# Patient Record
Sex: FEMALE | Race: WHITE | NOT HISPANIC OR LATINO | Employment: FULL TIME
[De-identification: names, ages, dates, MRNs, and addresses within clinical notes are randomized per-mention and may not be internally consistent; named-entity substitution may affect disease eponyms.]

---

## 2017-06-03 ENCOUNTER — HEALTH MAINTENANCE LETTER (OUTPATIENT)
Age: 42
End: 2017-06-03

## 2022-02-11 ENCOUNTER — TRANSFERRED RECORDS (OUTPATIENT)
Dept: ORTHOPEDICS | Facility: CLINIC | Age: 47
End: 2022-02-11
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
## 2022-05-04 ENCOUNTER — OFFICE VISIT (OUTPATIENT)
Dept: OPHTHALMOLOGY | Facility: CLINIC | Age: 47
End: 2022-05-04
Attending: OPTOMETRIST
Payer: COMMERCIAL

## 2022-05-04 DIAGNOSIS — H25.13 NUCLEAR SENILE CATARACT OF BOTH EYES: ICD-10-CM

## 2022-05-04 DIAGNOSIS — H52.4 PRESBYOPIA OF BOTH EYES: ICD-10-CM

## 2022-05-04 DIAGNOSIS — H02.823 SEBACEOUS CYST OF EYELID, RIGHT: ICD-10-CM

## 2022-05-04 DIAGNOSIS — D31.31 CHOROIDAL NEVUS OF RIGHT EYE: Primary | ICD-10-CM

## 2022-05-04 PROBLEM — I21.4 NSTEMI (NON-ST ELEVATED MYOCARDIAL INFARCTION) (H): Status: ACTIVE | Noted: 2020-04-25

## 2022-05-04 PROBLEM — F15.921: Status: ACTIVE | Noted: 2021-03-10

## 2022-05-04 PROBLEM — F17.200 TOBACCO USE DISORDER: Status: ACTIVE | Noted: 2022-02-23

## 2022-05-04 PROBLEM — R07.89 CHEST TIGHTNESS: Status: ACTIVE | Noted: 2020-04-24

## 2022-05-04 PROBLEM — M05.9 RHEUMATOID ARTHRITIS, SEROPOSITIVE (H): Status: ACTIVE | Noted: 2022-01-30

## 2022-05-04 PROBLEM — E78.49 OTHER HYPERLIPIDEMIA: Status: ACTIVE | Noted: 2022-01-30

## 2022-05-04 PROCEDURE — 92015 DETERMINE REFRACTIVE STATE: CPT

## 2022-05-04 PROCEDURE — G0463 HOSPITAL OUTPT CLINIC VISIT: HCPCS | Mod: 25

## 2022-05-04 PROCEDURE — 92004 COMPRE OPH EXAM NEW PT 1/>: CPT | Performed by: OPTOMETRIST

## 2022-05-04 PROCEDURE — 92250 FUNDUS PHOTOGRAPHY W/I&R: CPT | Performed by: OPTOMETRIST

## 2022-05-04 RX ORDER — FOLIC ACID 1 MG/1
1 TABLET ORAL DAILY
COMMUNITY
Start: 2021-03-12

## 2022-05-04 RX ORDER — AMLODIPINE BESYLATE 2.5 MG/1
2.5 TABLET ORAL
COMMUNITY
Start: 2022-04-12 | End: 2022-07-11

## 2022-05-04 RX ORDER — GABAPENTIN 600 MG/1
600 TABLET ORAL PRN
COMMUNITY
Start: 2022-03-19

## 2022-05-04 RX ORDER — LISDEXAMFETAMINE DIMESYLATE 10 MG/1
10 CAPSULE ORAL DAILY
COMMUNITY
Start: 2022-04-29

## 2022-05-04 RX ORDER — SIMVASTATIN 20 MG
20 TABLET ORAL AT BEDTIME
COMMUNITY
Start: 2022-02-22

## 2022-05-04 RX ORDER — LAMOTRIGINE 200 MG/1
150 TABLET ORAL AT BEDTIME
COMMUNITY
Start: 2021-03-12

## 2022-05-04 RX ORDER — HYDROCHLOROTHIAZIDE 25 MG/1
12.5 TABLET ORAL
COMMUNITY
Start: 2021-03-12

## 2022-05-04 ASSESSMENT — REFRACTION_MANIFEST
OD_AXIS: 170
OS_SPHERE: PLANO
OD_SPHERE: +0.25
OD_CYLINDER: +0.50
OS_CYLINDER: +0.50
OS_ADD: +2.00
OS_AXIS: 180
OD_ADD: +2.00

## 2022-05-04 ASSESSMENT — TONOMETRY
OD_IOP_MMHG: 16
IOP_METHOD: TONOPEN
OS_IOP_MMHG: 15

## 2022-05-04 ASSESSMENT — CONF VISUAL FIELD
METHOD: COUNTING FINGERS
OD_NORMAL: 1
OS_NORMAL: 1

## 2022-05-04 ASSESSMENT — SLIT LAMP EXAM - LIDS: COMMENTS: NORMAL

## 2022-05-04 ASSESSMENT — VISUAL ACUITY
METHOD: SNELLEN - LINEAR
OD_SC: 20/20
OS_SC: 20/20

## 2022-05-04 ASSESSMENT — EXTERNAL EXAM - RIGHT EYE: OD_EXAM: NORMAL

## 2022-05-04 ASSESSMENT — EXTERNAL EXAM - LEFT EYE: OS_EXAM: NORMAL

## 2022-05-04 NOTE — NURSING NOTE
Chief Complaints and History of Present Illnesses   Patient presents with     Blurred Vision Evaluation     Chief Complaint(s) and History of Present Illness(es)     Blurred Vision Evaluation               Comments     Pt states that she is having difficulty reading up close x3 months. Pt using OTC readers occasionally.  No eye pain today. No flashes or floaters. No redness. Dryness in BE, relief with drops.    MONICA Nichols May 4, 2022 8:26 AM

## 2022-06-07 ENCOUNTER — TRANSFERRED RECORDS (OUTPATIENT)
Dept: ORTHOPEDICS | Facility: CLINIC | Age: 47
End: 2022-06-07

## 2022-08-26 ENCOUNTER — OFFICE VISIT (OUTPATIENT)
Dept: ORTHOPEDICS | Facility: CLINIC | Age: 47
End: 2022-08-26
Payer: COMMERCIAL

## 2022-08-26 VITALS
HEIGHT: 67 IN | DIASTOLIC BLOOD PRESSURE: 72 MMHG | HEART RATE: 80 BPM | BODY MASS INDEX: 29.76 KG/M2 | SYSTOLIC BLOOD PRESSURE: 109 MMHG

## 2022-08-26 DIAGNOSIS — M67.88 ACHILLES TENDINOSIS: ICD-10-CM

## 2022-08-26 DIAGNOSIS — M25.571 CHRONIC PAIN OF RIGHT ANKLE: Primary | ICD-10-CM

## 2022-08-26 DIAGNOSIS — G89.29 CHRONIC PAIN OF RIGHT ANKLE: Primary | ICD-10-CM

## 2022-08-26 PROCEDURE — 99204 OFFICE O/P NEW MOD 45 MIN: CPT | Performed by: PEDIATRICS

## 2022-08-26 NOTE — PATIENT INSTRUCTIONS
Plan:  - Today's Plan of Care:  Referral to an Orthopedic Surgeon - Dr. Weaver  Discussed activity considerations and other supportive care including Ice/Heat, OTC and other topical medications as needed.    -We also discussed other future treatment options:  Referral to Physical Therapy  Discussed other non -operative options    Follow Up: as needed    If you have any further questions for your physician or physician s care team you can call [phone] and use option 3 to leave a voice message.

## 2022-08-26 NOTE — LETTER
8/26/2022         RE: Tamica Peguero  [address] [address]        Dear Colleague,    Thank you for referring your patient, Tamica Peguero, to the [facility] [place]. Please see a copy of my visit note below.    ASSESSMENT & PLAN    Tamica was seen today for pain.    Diagnoses and all orders for this visit:    Chronic pain of right ankle  -     Orthopedic  Referral; Future    Achilles tendinosis  -     Orthopedic  Referral; Future      This issue is chronic and Unchanged.  Discussed the diagnosis and potential treatment including physical therapy for eccentric strengthening.  Could consider heel cups or arch support as well.  Stressed the importance of rest from irritating activities.  If severe or does not improve, would consider boot immobilization for 1-2 weeks, advanced imaging or referral.  -Patient desires a discussion with orthopedic surgery for surgical options given this was already scheduled    Plan:  - Today's Plan of Care:  Referral to an Orthopedic Surgeon - Dr. Weaver  Discussed activity considerations and other supportive care including Ice/Heat, OTC and other topical medications as needed.    -We also discussed other future treatment options:  Referral to Physical Therapy  Discussed other non -operative options    Follow Up: as needed      Concerning signs and symptoms were reviewed.  The patient expressed understanding of this management plan and all questions were answered at this time.    Luann Live MD [facility]  Sports Medicine Physician  [facility]      -----  Chief Complaint   Patient presents with     Right Ankle - Pain       SUBJECTIVE  Tamica Peguero is a/an 46 year old female who is seen as a self referral for evaluation of chronic right ankle pain.     The patient is seen by themselves.    Onset: 1 years(s) ago. Reports insidious onset without acute precipitating event.  Location of  "Pain: right ankle; posterior, achilles tendon, gastrocnemius  Worsened by: walking, standing, any movement of the ankle  Better with: corticosteroid injection (2 weeks of relief)  Treatments tried: ice, heat and corticosteroid injection (most recent date: June 2022) that provided  2 week(s) of relief  Associated symptoms: weakness of right ankle, feeling of instability and lump on the posterior ankle    Orthopedic/Surgical history: YES - Date: previously seen at [facility] for the right ankle, states she was scheduled for surgery.  - Reviewed notes from [facility] Dr. Weaver which discussed home stretching program    Social History/Occupation: not currently working     No family history pertinent to patient's problem today.    REVIEW OF SYSTEMS:  Review of Systems  Skin: no bruising, yes posterior Achilles swelling  Musculoskeletal: as above  Neurologic: no numbness, paresthesias  Remainder of review of systems is negative including constitutional, CV, pulmonary, GI, except as noted in HPI or medical history.    OBJECTIVE:  /72   Pulse 80   Ht 1.702 m (5' 7\")   BMI 29.76 kg/m     General: healthy, alert and in no distress  HEENT: no scleral icterus or conjunctival erythema  Skin: no suspicious lesions or rash. No jaundice.  CV: distal perfusion intact  Resp: normal respiratory effort without conversational dyspnea   Psych: normal mood and affect  Gait: normal steady gait with appropriate coordination and balance  Neuro: Normal light sensory exam of lower extremity    Bilateral Foot and Ankle Exam:    Inspection:       Posterior Achilles tendon right    Foot inspection:       no deformity noted    Tender:       Posterior Achilles    Non-Tender:       remainder of ankle and foot bilateral    ROM:        Full active and passive ROM, ankle dorsiflexion, plantarflexion, inversion, eversion, great toe dorsiflexion, remainder of toes, midfoot and subtalar bilateral    Strength:       ankle dorsiflexion 5/5 bilateral       " plantarflexion 5/5 bilateral       inversion 5/5 bilateral       eversion 5/5 bilateral    Special Tests:       neg (-) anterior drawer right       neg (-) talar tilt right    Gait:       normal    Neurovascular:       2+ peripheral pulses bilaterally and brisk capillary refill       sensation grossly intact      RADIOLOGY:  I independently ordered, visualized and reviewed these images with the patient  Reviewed 3 views of right ankle 2/9/2022 - no acute bony abnormality, no significant degenerative change    Reviewed MRI of the right ankle from 2/11/2022 -prominent thickening of Achilles tendinosis peroneus brevis tendinopathy and chronic anterior talofibular ligament sprain    Reviewed [facility] notes  Review of the result(s) of each unique test - XR and MRI  {2021 E&M time (Optional):097177}  {Provider  Link to [facility] Help Grid :246719}         Again, thank you for allowing me to participate in the care of your patient.        Sincerely,        Luann Live MD

## 2022-08-26 NOTE — PROGRESS NOTES
ASSESSMENT & PLAN    Tamica was seen today for pain.    Diagnoses and all orders for this visit:    Chronic pain of right ankle  -     Orthopedic  Referral; Future    Achilles tendinosis  -     Orthopedic  Referral; Future      This issue is chronic and Unchanged.  Discussed the diagnosis and potential treatment including physical therapy for eccentric strengthening.  Could consider heel cups or arch support as well.  Stressed the importance of rest from irritating activities.  If severe or does not improve, would consider boot immobilization for 1-2 weeks, advanced imaging or referral.  -Patient desires a discussion with orthopedic surgery for surgical options given this was already scheduled    Plan:  - Today's Plan of Care:  Referral to an Orthopedic Surgeon - Dr. Weaver  Discussed activity considerations and other supportive care including Ice/Heat, OTC and other topical medications as needed.    -We also discussed other future treatment options:  Referral to Physical Therapy  Discussed other non -operative options    Follow Up: as needed      Concerning signs and symptoms were reviewed.  The patient expressed understanding of this management plan and all questions were answered at this time.    Luann Live MD [facility]  Sports Medicine Physician  [facility]      -----  Chief Complaint   Patient presents with     Right Ankle - Pain       SUBJECTIVE  Tamica Peguero is a/an 46 year old female who is seen as a self referral for evaluation of chronic right ankle pain.     The patient is seen by themselves.    Onset: 1 years(s) ago. Reports insidious onset without acute precipitating event.  Location of Pain: right ankle; posterior, achilles tendon, gastrocnemius  Worsened by: walking, standing, any movement of the ankle  Better with: corticosteroid injection (2 weeks of relief)  Treatments tried: ice, heat and corticosteroid injection (most recent date: June 2022) that  "provided  2 week(s) of relief  Associated symptoms: weakness of right ankle, feeling of instability and lump on the posterior ankle    Orthopedic/Surgical history: YES - Date: previously seen at [facility] for the right ankle, states she was scheduled for surgery.  - Reviewed notes from [facility] Dr. Weaver which discussed home stretching program    Social History/Occupation: not currently working     No family history pertinent to patient's problem today.    REVIEW OF SYSTEMS:  Review of Systems  Skin: no bruising, yes posterior Achilles swelling  Musculoskeletal: as above  Neurologic: no numbness, paresthesias  Remainder of review of systems is negative including constitutional, CV, pulmonary, GI, except as noted in HPI or medical history.    OBJECTIVE:  /72   Pulse 80   Ht 1.702 m (5' 7\")   BMI 29.76 kg/m     General: healthy, alert and in no distress  HEENT: no scleral icterus or conjunctival erythema  Skin: no suspicious lesions or rash. No jaundice.  CV: distal perfusion intact  Resp: normal respiratory effort without conversational dyspnea   Psych: normal mood and affect  Gait: normal steady gait with appropriate coordination and balance  Neuro: Normal light sensory exam of lower extremity    Bilateral Foot and Ankle Exam:    Inspection:       Posterior Achilles tendon right    Foot inspection:       no deformity noted    Tender:       Posterior Achilles    Non-Tender:       remainder of ankle and foot bilateral    ROM:        Full active and passive ROM, ankle dorsiflexion, plantarflexion, inversion, eversion, great toe dorsiflexion, remainder of toes, midfoot and subtalar bilateral    Strength:       ankle dorsiflexion 5/5 bilateral       plantarflexion 5/5 bilateral       inversion 5/5 bilateral       eversion 5/5 bilateral    Special Tests:       neg (-) anterior drawer right       neg (-) talar tilt right    Gait:       normal    Neurovascular:       2+ peripheral pulses bilaterally and brisk capillary " refill       sensation grossly intact      RADIOLOGY:  I independently ordered, visualized and reviewed these images with the patient  Reviewed 3 views of right ankle 2/9/2022 - no acute bony abnormality, no significant degenerative change    Reviewed MRI of the right ankle from 2/11/2022 -prominent thickening of Achilles tendinosis peroneus brevis tendinopathy and chronic anterior talofibular ligament sprain    Reviewed [facility] notes  Review of the result(s) of each unique test - XR and MRI

## 2022-11-03 DIAGNOSIS — D31.31 CHOROIDAL NEVUS OF RIGHT EYE: Primary | ICD-10-CM

## 2023-07-06 ENCOUNTER — HOSPITAL ENCOUNTER (OUTPATIENT)
Dept: MRI IMAGING | Facility: CLINIC | Age: 48
Discharge: HOME | End: 2023-07-06
Attending: ORTHOPAEDIC SURGERY | Admitting: ORTHOPAEDIC SURGERY
Payer: COMMERCIAL

## 2023-07-06 DIAGNOSIS — M25.569 KNEE PAIN: ICD-10-CM

## 2023-07-06 PROCEDURE — 73721 MRI JNT OF LWR EXTRE W/O DYE: CPT | Mod: RT

## 2023-07-06 PROCEDURE — 73721 MRI JNT OF LWR EXTRE W/O DYE: CPT | Mod: 26 | Performed by: RADIOLOGY

## 2023-07-17 ENCOUNTER — TELEPHONE (OUTPATIENT)
Dept: ORTHOPEDICS | Facility: CLINIC | Age: 48
End: 2023-07-17

## 2023-07-17 NOTE — TELEPHONE ENCOUNTER
MR right knee without contrast 7/6/2023 2:12 PM     Techniques: Multiplanar multisequence imaging of the right knee was  obtained without administration of intra-articular or intravenous  contrast using routing protocol.     History: rule out medial meniscus tear; Knee pain     Comparison: None available     Findings:     MENISCI:  Medial meniscus: Mild intrasubstance meniscal degeneration of the  posterior horn without evidence of tear. The meniscus is mildly  extruded.  Lateral meniscus: Intact.     LIGAMENTS  Cruciate ligaments: Intact  Medial supporting structures: Intact  Lateral supporting structures: Iliotibial band, lateral collateral  ligament popliteus and biceps femoris tendons are intact and  unremarkable.     EXTENSOR MECHANISM  Intact.     FLUID  Small joint effusion. No substantial Baker's cyst.     OSSEOUS and ARTICULAR STRUCTURES  Bones: No fracture, contusion, or osseous lesion is seen.     Patellofemoral compartment: Focal full-thickness articular cartilage  loss involving the medial patellar facet with associated subcortical  cystic changes, articular cartilage loss of the medial trochlea,  findings are consistent with grade IV chondromalacia..     Medial compartment: Cartilage heterogeneity and superficial to  moderate grade fraying, consistent this grade II-III chondromalacia.     Lateral compartment: No significant hyaline cartilage disease.                                                                      Impression:  1. 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  2. Grade II-III chondromalacia of the medial compartment.  3. Mildly extruded medial meniscus.     JUTTA ELLERMANN, MD

## 2023-07-17 NOTE — TELEPHONE ENCOUNTER
Patient Returning Call    Reason for call:  patient had MRI done and states no one has gotten back to her with result or what he next steps are. Requesting callback for further instructions     Information relayed to patient:  te sent to clinic     Patient has additional questions:  No      [facility] or would you prefer to receive a phone call?:   Patient would prefer a phone call   Okay to leave a detailed message?: Yes at Cell number on file:    Telephone Information:   Mobile [phone]

## 2023-08-14 ENCOUNTER — APPOINTMENT (OUTPATIENT)
Dept: GENERAL RADIOLOGY | Facility: CLINIC | Age: 48
End: 2023-08-14
Attending: EMERGENCY MEDICINE
Payer: COMMERCIAL

## 2023-08-14 ENCOUNTER — HOSPITAL ENCOUNTER (EMERGENCY)
Facility: CLINIC | Age: 48
Discharge: HOME | End: 2023-08-14
Attending: EMERGENCY MEDICINE | Admitting: EMERGENCY MEDICINE
Payer: COMMERCIAL

## 2023-08-14 VITALS
HEART RATE: 89 BPM | WEIGHT: 220 LBS | DIASTOLIC BLOOD PRESSURE: 93 MMHG | RESPIRATION RATE: 18 BRPM | HEIGHT: 68 IN | TEMPERATURE: 97.4 F | BODY MASS INDEX: 33.34 KG/M2 | SYSTOLIC BLOOD PRESSURE: 130 MMHG | OXYGEN SATURATION: 97 %

## 2023-08-14 DIAGNOSIS — J98.01 ACUTE BRONCHOSPASM: ICD-10-CM

## 2023-08-14 DIAGNOSIS — J18.9 COMMUNITY ACQUIRED PNEUMONIA OF LEFT LOWER LOBE OF LUNG: ICD-10-CM

## 2023-08-14 LAB
FLUAV RNA SPEC QL NAA+PROBE: NEGATIVE
FLUBV RNA RESP QL NAA+PROBE: NEGATIVE
RSV RNA SPEC NAA+PROBE: NEGATIVE
SARS-COV-2 RNA RESP QL NAA+PROBE: NEGATIVE

## 2023-08-14 PROCEDURE — 250N000009 HC RX 250: Performed by: EMERGENCY MEDICINE

## 2023-08-14 PROCEDURE — 99284 EMERGENCY DEPT VISIT MOD MDM: CPT | Performed by: EMERGENCY MEDICINE

## 2023-08-14 PROCEDURE — 94640 AIRWAY INHALATION TREATMENT: CPT

## 2023-08-14 PROCEDURE — 71045 X-RAY EXAM CHEST 1 VIEW: CPT

## 2023-08-14 PROCEDURE — 87637 SARSCOV2&INF A&B&RSV AMP PRB: CPT | Performed by: EMERGENCY MEDICINE

## 2023-08-14 PROCEDURE — 99284 EMERGENCY DEPT VISIT MOD MDM: CPT | Mod: 25

## 2023-08-14 RX ORDER — IPRATROPIUM BROMIDE AND ALBUTEROL SULFATE 2.5; .5 MG/3ML; MG/3ML
3 SOLUTION RESPIRATORY (INHALATION) ONCE
Status: COMPLETED | OUTPATIENT
Start: 2023-08-14 | End: 2023-08-14

## 2023-08-14 RX ORDER — AZITHROMYCIN 250 MG/1
TABLET, FILM COATED ORAL
Qty: 6 TABLET | Refills: 0 | Status: SHIPPED | OUTPATIENT
Start: 2023-08-14 | End: 2023-08-19

## 2023-08-14 RX ORDER — CODEINE PHOSPHATE AND GUAIFENESIN 10; 100 MG/5ML; MG/5ML
1-2 SOLUTION ORAL EVERY 4 HOURS PRN
Qty: 120 ML | Refills: 0 | Status: SHIPPED | OUTPATIENT
Start: 2023-08-14

## 2023-08-14 RX ORDER — PREDNISONE 20 MG/1
TABLET ORAL
Qty: 12 TABLET | Refills: 0 | Status: SHIPPED | OUTPATIENT
Start: 2023-08-14 | End: 2023-10-16

## 2023-08-14 RX ORDER — ALBUTEROL SULFATE 90 UG/1
2 AEROSOL, METERED RESPIRATORY (INHALATION) EVERY 6 HOURS PRN
Qty: 18 G | Refills: 0 | Status: SHIPPED | OUTPATIENT
Start: 2023-08-14 | End: 2023-09-13

## 2023-08-14 RX ADMIN — IPRATROPIUM BROMIDE AND ALBUTEROL SULFATE 3 ML: .5; 3 SOLUTION RESPIRATORY (INHALATION) at 18:19

## 2023-08-14 ASSESSMENT — ACTIVITIES OF DAILY LIVING (ADL): ADLS_ACUITY_SCORE: 35

## 2023-08-14 NOTE — ED TRIAGE NOTES
Chest congestion and SOA for 2 weeks. COVID tests negative. Advised by PCP to be seen in ED     Triage Assessment       Row Name 08/14/23 [id number]       Triage Assessment (Adult)    Airway WDL WDL       Respiratory WDL    Respiratory WDL X;rhythm/pattern    Rhythm/Pattern, Respiratory shortness of breath       Skin Circulation/Temperature WDL    Skin Circulation/Temperature WDL WDL       Cardiac WDL    Cardiac WDL WDL       Peripheral/Neurovascular WDL    Peripheral Neurovascular WDL WDL       Cognitive/Neuro/Behavioral WDL    Cognitive/Neuro/Behavioral WDL WDL

## 2023-08-14 NOTE — ED PROVIDER NOTES
History     Chief Complaint   Patient presents with    Shortness of Breath     Chest congestion and SOA for 2 weeks     HPI  History per patient, review of [facility] EMR and Care Everywhere EMR, including review of telephone encounter from earlier today, recent clinic visits and personal review and interpretation of her most recent chest x-ray from 11/15/2021 and prior chest x-rays.  Tamica Peguero is a 47 year old female with rheumatoid arthritis, on chronic immunosuppressive therapy with methotrexate who presents with 2 weeks of URI symptoms with cough productive of yellow-brown sputum, chest congestion, shortness of breath and wheezing.  No localized chest pain, just diffuse tightness and congestion wheezing. No hemoptysis or leg pain or leg swelling.  No fever or chills. She was recently exposed to RSV, family member.  No recent travel or other known infectious exposures.  No history of asthma.  She smokes.  No other pertinent history or acute complaints or concerns.    Allergies:  Allergies   Allergen Reactions    Methylpyrrolidone Nausea and Vomiting    Morphine Nausea and Vomiting    No Known Allergies     Oxycodone Nausea and Vomiting    Ibuprofen GI Disturbance     GI irritation       Problem List:    Patient Active Problem List    Diagnosis Date Noted    Tobacco use disorder 02/23/2022     Priority: Medium     Formatting of this note might be different from the original.  She has smoked since she was 14yo      Other hyperlipidemia 01/30/2022     Priority: Medium    Rheumatoid arthritis, seropositive (H) 01/30/2022     Priority: Medium    Delirium due to methamphetamine intoxication (H) 03/10/2021     Priority: Medium    NSTEMI (non-ST elevated myocardial infarction) (H) 04/25/2020     Priority: Medium    Chest tightness 04/24/2020     Priority: Medium    Anemia 04/07/2016     Priority: Medium    Encephalopathy acute 04/07/2016     Priority: Medium    Hypothermia 04/07/2016     Priority: Medium     CARDIOVASCULAR SCREENING; LDL GOAL LESS THAN 160 05/09/2010     Priority: Medium    Bipolar 2 disorder (H) 04/13/2010     Priority: Medium    Other specified idiopathic peripheral neuropathy 01/08/2008     Priority: Medium    Lumbago 01/08/2008     Priority: Medium    Temporomandibular joint disorder 01/08/2008     Priority: Medium     Problem list name updated by automated process. Provider to review      Insomnia 11/02/2007     Priority: Medium     Problem list name updated by automated process. Provider to review          Past Medical History:    Past Medical History:   Diagnosis Date    Anxiety     Heroin abuse (H)     Methamphetamine abuse (H)     Schizophrenia (H)        Past Surgical History:    History reviewed. No pertinent surgical history.    Family History:    Family History   Problem Relation Age of Onset    Glaucoma Mother     Diabetes No family hx of     C.A.D. No family hx of     Hypertension No family hx of     Cerebrovascular Disease No family hx of     Breast Cancer No family hx of     Cancer - colorectal No family hx of     Prostate Cancer No family hx of     Macular Degeneration No family hx of        Social History:  Marital Status:   [4]  Social History     Tobacco Use    Smoking status: Every Day     Packs/day: 0.25     Types: Cigarettes    Smokeless tobacco: Current   Substance Use Topics    Alcohol use: No    Drug use: No        Medications:    albuterol (PROAIR HFA/PROVENTIL HFA/VENTOLIN HFA) 108 (90 Base) MCG/ACT inhaler  amoxicillin-clavulanate (AUGMENTIN) 875-125 MG tablet  azithromycin (ZITHROMAX) 250 MG tablet  guaiFENesin-codeine (ROBITUSSIN AC) 100-10 MG/5ML solution  predniSONE (DELTASONE) 20 MG tablet  AMBIEN 10 MG OR TABS  AMBIEN 10 MG OR TABS  AMBIEN 10 MG OR TABS  CELEXA 20 MG OR TABS  FLONASE INHA 50 MCG/DOSE NA  folic acid (FOLVITE) 1 MG tablet  gabapentin (NEURONTIN) 600 MG tablet  hydrochlorothiazide (HYDRODIURIL) 25 MG tablet  IBUPROFEN 800 MG OR TABS  lamoTRIgine  "(LAMICTAL) 200 MG tablet  methotrexate 2.5 MG tablet  simvastatin (ZOCOR) 20 MG tablet  VYVANSE 10 MG capsule        Review of Systems  No other pertinent history or acute complaints or concerns.    Physical Exam   BP: (!) 130/93  Pulse: 89  Temp: 97.4  F (36.3  C)  Resp: 18  Height: 172.7 cm (5' 8\")  Weight: 99.8 kg (220 lb)  SpO2: 97 %      Physical Exam  Vitals and nursing note reviewed.   Constitutional:       General: She is not in acute distress.     Appearance: Normal appearance. She is well-developed. She is not ill-appearing or diaphoretic.   HENT:      Head: Normocephalic and atraumatic.      Right Ear: External ear normal.      Left Ear: External ear normal.      Nose: Nose normal.   Eyes:      General: No scleral icterus.     Extraocular Movements: Extraocular movements intact.      Conjunctiva/sclera: Conjunctivae normal.   Neck:      Trachea: No tracheal deviation.   Cardiovascular:      Rate and Rhythm: Normal rate and regular rhythm.      Pulses: Normal pulses.      Heart sounds: Normal heart sounds. No murmur heard.     No friction rub. No gallop.   Pulmonary:      Effort: Pulmonary effort is normal. No tachypnea, accessory muscle usage, prolonged expiration or respiratory distress.      Breath sounds: Decreased air movement (Minimally decreased breath sounds with scattered end expiratory wheezes, no respiratory distress or hypoxia, able to speak in normal sentences) present. No stridor. Wheezing and rhonchi present. No rales.   Abdominal:      General: There is no distension.      Palpations: Abdomen is soft.      Tenderness: There is no abdominal tenderness.   Musculoskeletal:         General: No swelling or tenderness. Normal range of motion.      Cervical back: Normal range of motion and neck supple.      Right lower leg: No edema.      Left lower leg: No edema.   Skin:     General: Skin is warm and dry.      Coloration: Skin is not pale.      Findings: No erythema or rash.   Neurological:      " General: No focal deficit present.      Mental Status: She is alert and oriented to person, place, and time.   Psychiatric:         Mood and Affect: Mood normal.         Behavior: Behavior normal.         ED Course           Procedures              Results for orders placed or performed during the hospital encounter of 08/14/23 (from the past 24 hour(s))   Symptomatic Influenza A/B, RSV, & SARS-CoV2 PCR (COVID-19) Nasopharyngeal    Specimen: Nasopharyngeal; Swab   Result Value Ref Range    Influenza A PCR Negative Negative    Influenza B PCR Negative Negative    RSV PCR Negative Negative    SARS CoV2 PCR Negative Negative    Narrative    Testing was performed using the Xpert Xpress CoV2/Flu/RSV Assay on the Cepheid GeneXpert Instrument. This test should be ordered for the detection of SARS-CoV-2, influenza, and RSV viruses in individuals who meet clinical and/or epidemiological criteria. Test performance is unknown in asymptomatic patients. This test is for in vitro diagnostic use under the FDA EUA for laboratories certified under CLIA to perform high or moderate complexity testing. This test has not been FDA cleared or approved. A negative result does not rule out the presence of PCR inhibitors in the specimen or target RNA in concentration below the limit of detection for the assay. If only one viral target is positive but coinfection with multiple targets is suspected, the sample should be re-tested with another FDA cleared, approved, or authorized test, if coinfection would change clinical management. This test was validated by the [facility]. These laboratories are certified under the Clinical Laboratory Improvement Amendments of 1988 (CLIA-88) as qualified to perform high complexity laboratory testing.   XR Chest Port 1 View    Narrative    EXAM: XR CHEST PORT 1 VIEW  LOCATION: [facility]  DATE: 8/14/2023    INDICATION: 2 weeks of productive cough and chest  congestion  COMPARISON: None.      Impression    IMPRESSION: Heart size and pulmonary vessels are normal. Slight localized interstitial prominence seen at left lung base could relate to developing lingular region pneumonia. Lungs otherwise clear.     I independently reviewed the X-rays: Agree with the Radiologist's interpretation.    Medications   ipratropium - albuterol 0.5 mg/2.5 mg/3 mL (DUONEB) neb solution 3 mL (3 mLs Nebulization $Given 8/14/23 1819)       4:33 PM - multiple attempts at lab draw and IV start were successful.  Uncomfortable deferring laboratory evaluation as my clinical gestalt for emergent disease process such as PE/DVT, atypical ACS or other cause for her chest discomfort is extremely low.  We will proceed with a chest x-ray to evaluate for pneumonia or other emergent disease process.  Tamica is comfortable with this approach after we discussed the issues surrounding this and perform shared decision-making about her evaluation.  Do not feel that laboratory evaluation will change clinical management as a suspect she has bronchitis/relatively mild community-acquired pneumonia and antibiotic therapy will be initiated.    She feels much improved after a DuoNeb with increased air movement and decreased wheezing, but with mild tremulousness as a side effect of the albuterol.  She appears stable and appropriate for discharge home and outpatient management for commune acquired pneumonia bronchospasm.    Assessments & Plan (with Medical Decision Making)   47-year-old female with 2 weeks of productive cough and symptoms of RAD with no respiratory distress or hypoxia.  Chest x-ray shows patchy opacities overlying the mid left diaphragm consistent with early/mild community-acquired pneumonia.  She was treated with a DuoNeb and will be discharged with rx for Augmentin and Azithromycin, albuterol inhaler and prednisone, and Robitussin-AC cough syrup to use as needed.  She was provided instructions for  supportive care and will return as needed for worsened condition or worsening symptoms, or new problems or concerns.      I have reviewed the nursing notes.    I have reviewed the findings, diagnosis, plan and need for follow up with the patient.    Medical Decision Making  Hospitalization for IV antibiotic therapy, observation and continued nebulizer therapy was considered and deferred.  She currently appears stable and appropriate for outpatient management with close f/u.        Discharge Medication List as of 8/14/2023  6:53 PM        START taking these medications    Details   albuterol (PROAIR HFA/PROVENTIL HFA/VENTOLIN HFA) 108 (90 Base) MCG/ACT inhaler Inhale 2 puffs into the lungs every 6 hours as needed for shortness of breath or wheezing, Disp-18 g, R-0, E-Prescribe      amoxicillin-clavulanate (AUGMENTIN) 875-125 MG tablet Take 1 tablet by mouth 2 times daily for 7 days, Disp-14 tablet, R-0, E-Prescribe      azithromycin (ZITHROMAX) 250 MG tablet Take 2 tablets (500 mg) by mouth daily for 1 day, THEN 1 tablet (250 mg) daily for 4 days., Disp-6 tablet, R-0, E-Prescribe      guaiFENesin-codeine (ROBITUSSIN AC) 100-10 MG/5ML solution Take 5-10 mLs by mouth every 4 hours as needed for cough, Disp-120 mL, R-0, E-Prescribe      predniSONE (DELTASONE) 20 MG tablet Take two tablets (40mg) daily for 6 days, Disp-12 tablet, R-0, E-Prescribe             Final diagnoses:   Community acquired pneumonia of left lower lobe of lung - Mild, early   Acute bronchospasm       8/14/2023   [facility] EMERGENCY DEPT       Ashley, Kem COLLAZO MD  08/14/23 3004

## 2023-08-14 NOTE — LETTER
August 14, 2023      To Whom It May Concern:      Tamica Peguero was seen in our Emergency Department today, Monday 08/14/23.  I expect her condition to improve over the next several days.  Please excuse her from work tomorrow, 8/15/2023, and Wednesday 8/16/2023 if needed. She may return to work/school when improved.    Sincerely,        GEMA Ramirez MD

## 2023-08-20 ENCOUNTER — HEALTH MAINTENANCE LETTER (OUTPATIENT)
Age: 48
End: 2023-08-20

## 2023-10-16 ENCOUNTER — HOSPITAL ENCOUNTER (EMERGENCY)
Facility: CLINIC | Age: 48
Discharge: HOME | End: 2023-10-16
Attending: EMERGENCY MEDICINE | Admitting: EMERGENCY MEDICINE
Payer: COMMERCIAL

## 2023-10-16 VITALS
HEART RATE: 65 BPM | RESPIRATION RATE: 16 BRPM | TEMPERATURE: 96.9 F | DIASTOLIC BLOOD PRESSURE: 88 MMHG | WEIGHT: 220 LBS | OXYGEN SATURATION: 99 % | BODY MASS INDEX: 33.45 KG/M2 | SYSTOLIC BLOOD PRESSURE: 137 MMHG

## 2023-10-16 DIAGNOSIS — R20.8 DYSESTHESIA: ICD-10-CM

## 2023-10-16 PROCEDURE — 99283 EMERGENCY DEPT VISIT LOW MDM: CPT | Performed by: EMERGENCY MEDICINE

## 2023-10-16 RX ORDER — PREDNISONE 20 MG/1
20 TABLET ORAL DAILY
Qty: 5 TABLET | Refills: 0 | Status: SHIPPED | OUTPATIENT
Start: 2023-10-16 | End: 2023-10-21

## 2023-10-16 ASSESSMENT — ACTIVITIES OF DAILY LIVING (ADL): ADLS_ACUITY_SCORE: 33

## 2023-10-17 NOTE — DISCHARGE INSTRUCTIONS
Prednisone as prescribed for 5 days, take gabapentin for 7 to 10 days    Follow-up primary care    Return here for any concerns

## 2023-10-17 NOTE — ED TRIAGE NOTES
"Right lower leg and right hand/arm pain for the past couple of days.  Patient describes pain as \"burning\".  Patient stated that a doctor told her that with her spinal injury that she may get neuropathy.  Patient also concerned about her blood pressure being myriam today.  Patient is on blood pressure medication and did take it today.     Triage Assessment (Adult)       Row Name 10/16/23 2007          Triage Assessment    Airway WDL WDL        Respiratory WDL    Respiratory WDL WDL        Skin Circulation/Temperature WDL    Skin Circulation/Temperature WDL WDL        Cardiac WDL    Cardiac WDL WDL        Peripheral/Neurovascular WDL    Peripheral Neurovascular WDL WDL        Cognitive/Neuro/Behavioral WDL    Cognitive/Neuro/Behavioral WDL WDL                     "

## 2023-10-17 NOTE — ED PROVIDER NOTES
History     Chief Complaint   Patient presents with    Muscle Pain    Hypertension     HPI  Tamica Peguero is a 47 year old female who presents complaining of burning sensation in her right foot and ankle, some burning in her right hand that radiates into her right arm some some tingling over her whole face.  Symptoms have waxed and waned throughout the last few days although worse tonight.  She has a history of rheumatoid arthritis seropositive, methotrexate and Simponi infusions every 6 weeks.  She denies fever.  She denies headache visual change difficulty speaking or swallowing numbness imbalance or clumsiness.  Currently working and living at a sober house, where she had to discharge several people over the last couple of days, reportedly been up and down stairs a lot more than usual.  Reports no illicit substances for the past year.  A lot of emotional upset recently with daughter who overdosed and is currently in ICU in [place].  Continues to smoke.  Follows with primary care and [facility] at [facility], recent labs accomplished and reviewed in epic at time of presentation.  Taking antihypertensive as prescribed, notes blood pressures have been higher than normal 150/100.  138/89 here.  Denies significant headache.  No fevers, no nausea vomiting diarrhea cough or sore throat.  No ill contacts that she is aware of.    Allergies:  Allergies   Allergen Reactions    Methylpyrrolidone Nausea and Vomiting    Morphine Nausea and Vomiting    No Known Allergies     Oxycodone Nausea and Vomiting    Ibuprofen GI Disturbance     GI irritation       Problem List:    Patient Active Problem List    Diagnosis Date Noted    Tobacco use disorder 02/23/2022     Priority: Medium     Formatting of this note might be different from the original.  She has smoked since she was 12yo      Other hyperlipidemia 01/30/2022     Priority: Medium    Rheumatoid arthritis, seropositive (H) 01/30/2022     Priority: Medium    Delirium  due to methamphetamine intoxication (H) 03/10/2021     Priority: Medium    NSTEMI (non-ST elevated myocardial infarction) (H) 04/25/2020     Priority: Medium    Chest tightness 04/24/2020     Priority: Medium    Anemia 04/07/2016     Priority: Medium    Encephalopathy acute 04/07/2016     Priority: Medium    Hypothermia 04/07/2016     Priority: Medium    CARDIOVASCULAR SCREENING; LDL GOAL LESS THAN 160 05/09/2010     Priority: Medium    Bipolar 2 disorder (H) 04/13/2010     Priority: Medium    Other specified idiopathic peripheral neuropathy 01/08/2008     Priority: Medium    Lumbago 01/08/2008     Priority: Medium    Temporomandibular joint disorder 01/08/2008     Priority: Medium     Problem list name updated by automated process. Provider to review      Insomnia 11/02/2007     Priority: Medium     Problem list name updated by automated process. Provider to review          Past Medical History:    Past Medical History:   Diagnosis Date    Anxiety     Heroin abuse (H)     Methamphetamine abuse (H)     Schizophrenia (H)        Past Surgical History:    No past surgical history on file.    Family History:    Family History   Problem Relation Age of Onset    Glaucoma Mother     Diabetes No family hx of     C.A.D. No family hx of     Hypertension No family hx of     Cerebrovascular Disease No family hx of     Breast Cancer No family hx of     Cancer - colorectal No family hx of     Prostate Cancer No family hx of     Macular Degeneration No family hx of        Social History:  Marital Status:   [4]  Social History     Tobacco Use    Smoking status: Every Day     Packs/day: .25     Types: Cigarettes    Smokeless tobacco: Current   Substance Use Topics    Alcohol use: No    Drug use: No        Medications:    predniSONE (DELTASONE) 20 MG tablet  albuterol (PROAIR HFA/PROVENTIL HFA/VENTOLIN HFA) 108 (90 Base) MCG/ACT inhaler  AMBIEN 10 MG OR TABS  AMBIEN 10 MG OR TABS  AMBIEN 10 MG OR TABS  CELEXA 20 MG OR  TABS  FLONASE INHA 50 MCG/DOSE NA  folic acid (FOLVITE) 1 MG tablet  gabapentin (NEURONTIN) 600 MG tablet  guaiFENesin-codeine (ROBITUSSIN AC) 100-10 MG/5ML solution  hydrochlorothiazide (HYDRODIURIL) 25 MG tablet  IBUPROFEN 800 MG OR TABS  lamoTRIgine (LAMICTAL) 200 MG tablet  methotrexate 2.5 MG tablet  simvastatin (ZOCOR) 20 MG tablet  VYVANSE 10 MG capsule          Review of Systems    Physical Exam   BP: (!) 146/100  Pulse: 90  Temp: 96.9  F (36.1  C)  Resp: 18  Weight: 99.8 kg (220 lb)  SpO2: 97 %      Physical Exam  GENERAL Nontoxic-appearing no respiratory distress alert and oriented x3. GCS 15 on arrival, throughout stay and at discharge.    HEENT Head atraumatic normocephalic     PERRL, EOMI, conjunctiva clear, sclera nonicteric, no discharge, no periorbital swelling or redness     Oropharynx moist without lesions, erythema or exudate.  Tongue is not swollen.     Nose is unremarkable to inspection, mucous membranes are moist and pink, no nasal discharge or bleeding    MUSCULOSKELETAL no joint redness tenderness or swelling, there is some tenderness to palpation of the right foot which is described as a burning sensation    PULMONARY lungs are clear to auscultation, breath sounds are symmetrical, bilateral chest rise, no rales rhonchi or wheezes appreciated    CARDIOVASCULAR heart is regular no murmur appreciated.  Peripheral pulses are symmetrical and strong.  Capillary refill is normal.     GASTROINTESTINAL abdomen is soft, nondistended, bowel sounds positive, no mass appreciated, no guarding or rebound    NEUROLOGICAL Cranial nerves; vision baseline fields intact, PERRL, EOMI, facial sensation intact to light touch, facial muscle tone intact and symmetrical, hearing grossly intact,swallowing without difficulty, SCM strength intact, tongue protrudes midline, shoulder shrug intact      Strength is 5/5 throughout all muscle groups of the extremities     Sensation intact to light touch     There is no  ataxia    SKIN skin is clear from rash, pink warm and dry    PSYCHIATRIC patient is alert, attentive, good eye contact, well kempt, thought processes are rational and organized, affect is normal, mood is neutral, patient is not agitated, no delusions, able to answer questions appropriately    ED Course                 Procedures         No results found for this or any previous visit (from the past 24 hour(s)).    Medications - No data to display    Assessments & Plan (with Medical Decision Making)  Burning sensation right hand right foot, tingling over face, recent severe social stressor outlined above.  History of seropositive rheumatoid arthritis.  No evidence for infection.  Symptoms are not consistent with CNS ischemia no indication for CNS imaging at this time.  Peripheral neuropathy is possibility.  Anxiety exacerbation possibility.  No evidence for peripheral vascular disease pulses intact in the feet and wrist.  No focal neurologic finding on exam.  No indication for further evaluation.  We will try short course of prednisone, also may take gabapentin as previously prescribed.  Follow-up primary care.  Return criteria reviewed.     I have reviewed the nursing notes.    I have reviewed the findings, diagnosis, plan and need for follow up with the patient.          Discharge Medication List as of 10/16/2023 11:35 PM          Final diagnoses:   Dysesthesia       10/16/2023   [facility] EMERGENCY DEPT       Karlos Hollingsworth MD  10/17/23 0122

## 2023-11-29 ENCOUNTER — APPOINTMENT (OUTPATIENT)
Dept: GENERAL RADIOLOGY | Facility: CLINIC | Age: 48
End: 2023-11-29
Attending: NURSE PRACTITIONER
Payer: COMMERCIAL

## 2023-11-29 ENCOUNTER — HOSPITAL ENCOUNTER (EMERGENCY)
Facility: CLINIC | Age: 48
Discharge: HOME | End: 2023-11-29
Attending: NURSE PRACTITIONER | Admitting: NURSE PRACTITIONER
Payer: COMMERCIAL

## 2023-11-29 VITALS
SYSTOLIC BLOOD PRESSURE: 141 MMHG | OXYGEN SATURATION: 97 % | TEMPERATURE: 97.8 F | HEART RATE: 73 BPM | DIASTOLIC BLOOD PRESSURE: 89 MMHG

## 2023-11-29 DIAGNOSIS — S29.9XXA THORACIC INJURY, INITIAL ENCOUNTER: ICD-10-CM

## 2023-11-29 DIAGNOSIS — R07.81 RIB PAIN ON RIGHT SIDE: ICD-10-CM

## 2023-11-29 PROCEDURE — 71100 X-RAY EXAM RIBS UNI 2 VIEWS: CPT | Mod: RT

## 2023-11-29 PROCEDURE — 71046 X-RAY EXAM CHEST 2 VIEWS: CPT

## 2023-11-29 PROCEDURE — G0463 HOSPITAL OUTPT CLINIC VISIT: HCPCS | Mod: 25

## 2023-11-29 PROCEDURE — 99213 OFFICE O/P EST LOW 20 MIN: CPT | Performed by: NURSE PRACTITIONER

## 2023-11-29 RX ORDER — LIDOCAINE 50 MG/G
1 PATCH TOPICAL EVERY 24 HOURS
Qty: 7 PATCH | Refills: 0 | Status: SHIPPED | OUTPATIENT
Start: 2023-11-29 | End: 2023-12-06

## 2023-11-29 ASSESSMENT — ACTIVITIES OF DAILY LIVING (ADL): ADLS_ACUITY_SCORE: 35

## 2023-11-29 NOTE — ED TRIAGE NOTES
Pt presents from a fall walking downstairs and landed on back /hip area.  Right side and left hip pain. Suspects broken ribs.

## 2023-11-29 NOTE — ED PROVIDER NOTES
ED Provider Note  [facility]      History   No chief complaint on file.    HPI  Tamica Peguero is a 47 year old female who reports falling on stairs about an hour ago.  Reports that she fell onto her right hip and right rib area in the back.  Denies difficulty breathing.  Reports having a history of rib fractures, several years ago from an accident that she was in.  Reports that she has cancer and she has been followed by oncologist.            Allergies:  Allergies   Allergen Reactions    Methylpyrrolidone Nausea and Vomiting    Morphine Nausea and Vomiting    No Known Allergies     Oxycodone Nausea and Vomiting    Ibuprofen GI Disturbance     GI irritation       Problem List:    Patient Active Problem List    Diagnosis Date Noted    Tobacco use disorder 02/23/2022     Priority: Medium     Formatting of this note might be different from the original.  She has smoked since she was 12yo      Other hyperlipidemia 01/30/2022     Priority: Medium    Rheumatoid arthritis, seropositive (H) 01/30/2022     Priority: Medium    Delirium due to methamphetamine intoxication (H) 03/10/2021     Priority: Medium    NSTEMI (non-ST elevated myocardial infarction) (H) 04/25/2020     Priority: Medium    Chest tightness 04/24/2020     Priority: Medium    Anemia 04/07/2016     Priority: Medium    Encephalopathy acute 04/07/2016     Priority: Medium    Hypothermia 04/07/2016     Priority: Medium    CARDIOVASCULAR SCREENING; LDL GOAL LESS THAN 160 05/09/2010     Priority: Medium    Bipolar 2 disorder (H) 04/13/2010     Priority: Medium    Other specified idiopathic peripheral neuropathy 01/08/2008     Priority: Medium    Lumbago 01/08/2008     Priority: Medium    Temporomandibular joint disorder 01/08/2008     Priority: Medium     Problem list name updated by automated process. Provider to review      Insomnia 11/02/2007     Priority: Medium     Problem list name updated by automated process. Provider to  review          Past Medical History:    Past Medical History:   Diagnosis Date    Anxiety     Heroin abuse (H)     Methamphetamine abuse (H)     Schizophrenia (H)        Past Surgical History:    No past surgical history on file.    Family History:    Family History   Problem Relation Age of Onset    Glaucoma Mother     Diabetes No family hx of     C.A.D. No family hx of     Hypertension No family hx of     Cerebrovascular Disease No family hx of     Breast Cancer No family hx of     Cancer - colorectal No family hx of     Prostate Cancer No family hx of     Macular Degeneration No family hx of        Social History:  Marital Status:   [4]  Social History     Tobacco Use    Smoking status: Every Day     Packs/day: .25     Types: Cigarettes    Smokeless tobacco: Current   Substance Use Topics    Alcohol use: No    Drug use: No        Medications:    albuterol (PROAIR HFA/PROVENTIL HFA/VENTOLIN HFA) 108 (90 Base) MCG/ACT inhaler  AMBIEN 10 MG OR TABS  AMBIEN 10 MG OR TABS  AMBIEN 10 MG OR TABS  CELEXA 20 MG OR TABS  FLONASE INHA 50 MCG/DOSE NA  folic acid (FOLVITE) 1 MG tablet  gabapentin (NEURONTIN) 600 MG tablet  guaiFENesin-codeine (ROBITUSSIN AC) 100-10 MG/5ML solution  hydrochlorothiazide (HYDRODIURIL) 25 MG tablet  IBUPROFEN 800 MG OR TABS  lamoTRIgine (LAMICTAL) 200 MG tablet  methotrexate 2.5 MG tablet  simvastatin (ZOCOR) 20 MG tablet  VYVANSE 10 MG capsule          Review of Systems  A medically appropriate review of systems was performed with pertinent positives and negatives noted in the HPI, and all other systems negative.    Physical Exam   Patient Vitals for the past 24 hrs:   BP Temp Temp src Pulse SpO2   11/29/23 1712 (!) 141/89 97.8  F (36.6  C) Oral 73 97 %          Physical Exam  General: alert and in no acute distress on arrival  Head: atraumatic, normocephalic  Lungs:  nonlabored, CTA bilateral throughout  CV: Regular rate and rhythm, extremities warm and perfused  Abd: nondistended,  nontender  Skin: no rashes, no diaphoresis and skin color normal, mild bruising seen around side ribs right-sided  Neuro: Patient awake, alert, speech is fluent,   Psychiatric: affect/mood normal,        ED Course                 Procedures         EXAM: XR RIBS RIGHT 2 VIEWS  LOCATION: [facility]  DATE: 11/29/2023     INDICATION: right thoracic rib area painful post fall  COMPARISON: None.                                                                      IMPRESSION: The visualized heart and lungs are negative. No displaced rib fracture.                     EXAM: XR CHEST 2 VIEWS  LOCATION: [facility]  DATE: 11/29/2023     INDICATION: rib pain back thoracic area from fall  COMPARISON: None.                                                                      IMPRESSION:   Heart size and pulmonary vascularity within normal limits. Linear atelectasis or scarring in the left lung base. No focal lung infiltrates. Osseous structures grossly intact. Visualized upper abdomen unremarkable.                          No results found for this or any previous visit (from the past 24 hour(s)).    MEDICATIONS GIVEN IN THE EMERGENCY DEPARTMENT:  Medications - No data to display             Assessments & Plan (with Medical Decision Making)  47 year old female who presents to the Urgent Care for evaluation of thoracic injury, rib pain on the right side       I have reviewed the nursing notes.  Recommend ongoing icing, heat and several days.  Stay mobile to prevent stiffness.  Declines order for naproxen reports that she has over-the-counter Aleve that she will use.  Lidoderm patch ordered for use on right rib area and hip  I have reviewed the findings, diagnosis, plan and need for follow up with the patient.        NEW PRESCRIPTIONS STARTED AT TODAY'S ER VISIT  Discharge Medication List as of 11/29/2023  7:06 PM        START taking these medications    Details   lidocaine  (LIDODERM) 5 % patch Place 1 patch onto the skin every 24 hours for 7 days To prevent lidocaine toxicity, patient should be patch free for 12 hrs daily.Disp-7 patch, [id number]           Follow-up with primary provider if symptoms or not improving, return here if symptoms worsen despite recommended treatment plan.  Final diagnoses:   Thoracic injury, initial encounter   Rib pain on right side       11/29/2023   [facility] EMERGENCY DEPT       Deepa Collazo, APRN CNP  12/04/23 2257

## 2023-11-30 NOTE — DISCHARGE INSTRUCTIONS
Recommend icing for 24 hours areas that are painful and heat if there is a bruise to reabsorb bruises.

## 2023-12-02 ENCOUNTER — APPOINTMENT (OUTPATIENT)
Dept: CT IMAGING | Facility: CLINIC | Age: 48
End: 2023-12-02
Attending: EMERGENCY MEDICINE
Payer: COMMERCIAL

## 2023-12-02 ENCOUNTER — HOSPITAL ENCOUNTER (EMERGENCY)
Facility: CLINIC | Age: 48
Discharge: HOME | End: 2023-12-02
Attending: EMERGENCY MEDICINE | Admitting: EMERGENCY MEDICINE
Payer: COMMERCIAL

## 2023-12-02 ENCOUNTER — NURSE TRIAGE (OUTPATIENT)
Dept: NURSING | Facility: CLINIC | Age: 48
End: 2023-12-02
Payer: COMMERCIAL

## 2023-12-02 VITALS
HEART RATE: 80 BPM | SYSTOLIC BLOOD PRESSURE: 134 MMHG | RESPIRATION RATE: 20 BRPM | DIASTOLIC BLOOD PRESSURE: 97 MMHG | TEMPERATURE: 97.9 F | OXYGEN SATURATION: 99 % | HEIGHT: 68 IN | WEIGHT: 220 LBS | BODY MASS INDEX: 33.34 KG/M2
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
## 2023-12-02 DIAGNOSIS — R07.89 CHEST WALL PAIN: ICD-10-CM

## 2023-12-02 DIAGNOSIS — R91.8 PULMONARY NODULES: ICD-10-CM

## 2023-12-02 PROCEDURE — 250N000013 HC RX MED GY IP 250 OP 250 PS 637: Performed by: EMERGENCY MEDICINE

## 2023-12-02 PROCEDURE — 99284 EMERGENCY DEPT VISIT MOD MDM: CPT | Performed by: EMERGENCY MEDICINE

## 2023-12-02 PROCEDURE — 99284 EMERGENCY DEPT VISIT MOD MDM: CPT | Mod: 25 | Performed by: EMERGENCY MEDICINE

## 2023-12-02 PROCEDURE — 71250 CT THORAX DX C-: CPT

## 2023-12-02 RX ORDER — IBUPROFEN 400 MG/1
400 TABLET, FILM COATED ORAL ONCE
Status: COMPLETED | OUTPATIENT
Start: 2023-12-02 | End: 2023-12-02

## 2023-12-02 RX ORDER — DIAZEPAM 5 MG
10 TABLET ORAL ONCE
Status: COMPLETED | OUTPATIENT
Start: 2023-12-02 | End: 2023-12-02

## 2023-12-02 RX ORDER — DIAZEPAM 5 MG
5-10 TABLET ORAL EVERY 8 HOURS PRN
Qty: 15 TABLET | Refills: 0 | Status: SHIPPED | OUTPATIENT
Start: 2023-12-02 | End: 2023-12-09

## 2023-12-02 RX ORDER — ACETAMINOPHEN 500 MG
1000 TABLET ORAL ONCE
Status: COMPLETED | OUTPATIENT
Start: 2023-12-02 | End: 2023-12-02

## 2023-12-02 RX ADMIN — DIAZEPAM 10 MG: 5 TABLET ORAL at 09:37

## 2023-12-02 RX ADMIN — ACETAMINOPHEN 1000 MG: 500 TABLET, FILM COATED ORAL at 09:28

## 2023-12-02 RX ADMIN — IBUPROFEN 400 MG: 400 TABLET ORAL at 09:28

## 2023-12-02 ASSESSMENT — ACTIVITIES OF DAILY LIVING (ADL)
ADLS_ACUITY_SCORE: 35
ADLS_ACUITY_SCORE: 35

## 2023-12-02 NOTE — TELEPHONE ENCOUNTER
Patient fell down some stairs and was seen in the ED. Patient has been treating with lidocaine patches. Patient woke up with severe pain on the right side under her arm pit to her spine. Patient has been using the lidocaine, heat packs, and ibuprofen. Pain has been manageable until last night. Patient needing assistance with any movement. Taking a deep breath is painful.  Protocol recommends ED now  Patient has another adult who can assist getting her to the [place] ED now  Ashly Evans RN   12/02/23 7:50 AM  [employer] Nurse Advisor      Reason for Disposition   SEVERE chest pain    Additional Information   Negative: Major injury from dangerous force or speed (e.g., MVA, fall > 10 feet or 3 meters)   Negative: Bullet wound, knife wound, or other penetrating object   Negative: Puncture wound that sounds life-threatening to the triager   Negative: SEVERE difficulty breathing (e.g., struggling for each breath, speaks in single words)   Negative: [1] Major bleeding (e.g., actively dripping or spurting) AND [2] can't be stopped   Negative: Open wound of the chest with sound of moving air (sucking wound) or visible air bubbles   Negative: Shock suspected (e.g., cold/pale/clammy skin, too weak to stand, low BP, rapid pulse)   Negative: Coughing or spitting up blood   Negative: Bluish (or gray) lips or face now   Negative: Unconscious or was unconscious   Negative: Sounds like a life-threatening emergency to the triager   Negative: [1] Injuries at more than 1 site AND [2] unsure which guideline to use   Negative: Chest pain not from an injury OR cause is unknown   Negative: Wound looks infected    Protocols used: Chest Injury-A-

## 2023-12-02 NOTE — ED TRIAGE NOTES
Patient seen in ED 11/29 for fall. Xray completed, no fracture seen. Pain had been well tolerated until last night. Pain now 10/10. Last OTC medications 0420 this morning. No bruising to right chest/back visualized. Tramadol also taken 0400.     Triage Assessment (Adult)       Row Name 12/02/23 0803          Triage Assessment    Airway WDL WDL        Respiratory WDL    Respiratory WDL WDL        Skin Circulation/Temperature WDL    Skin Circulation/Temperature WDL WDL        Cardiac WDL    Cardiac WDL WDL        Peripheral/Neurovascular WDL    Peripheral Neurovascular WDL WDL        Cognitive/Neuro/Behavioral WDL    Cognitive/Neuro/Behavioral WDL WDL

## 2023-12-02 NOTE — DISCHARGE INSTRUCTIONS
Ibuprofen 800 mg/acetaminophen 1000 mg every 6-8 hours    Diazepam for muscle spasm    Activity as tolerated    Follow-up primary care regarding surveillance of pulmonary nodules

## 2023-12-02 NOTE — ED NOTES
Pt feeling better, still painful to move around, but improved.  Pt watching videos on her phone.  MD updated.  PLAN: Await disposition.

## 2023-12-02 NOTE — ED PROVIDER NOTES
History     Chief Complaint   Patient presents with    Rib Pain     HPI  Tamica Peguero is a 47 year old female who presents from home secondary to right rib pain.  Slipped and fell on some stairs that were carpeted on 11/29.  Seen here had rib films done negative for acute finding.  Been using OTC meds with moderate relief, worse pain this morning.  No shortness of air denies abdominal pain.    Allergies:  Allergies   Allergen Reactions    Methylpyrrolidone Nausea and Vomiting    Morphine Nausea and Vomiting    No Known Allergies     Oxycodone Nausea and Vomiting    Ibuprofen GI Disturbance     GI irritation       Problem List:    Patient Active Problem List    Diagnosis Date Noted    Tobacco use disorder 02/23/2022     Priority: Medium     Formatting of this note might be different from the original.  She has smoked since she was 12yo      Other hyperlipidemia 01/30/2022     Priority: Medium    Rheumatoid arthritis, seropositive (H) 01/30/2022     Priority: Medium    Delirium due to methamphetamine intoxication (H) 03/10/2021     Priority: Medium    NSTEMI (non-ST elevated myocardial infarction) (H) 04/25/2020     Priority: Medium    Chest tightness 04/24/2020     Priority: Medium    Anemia 04/07/2016     Priority: Medium    Encephalopathy acute 04/07/2016     Priority: Medium    Hypothermia 04/07/2016     Priority: Medium    CARDIOVASCULAR SCREENING; LDL GOAL LESS THAN 160 05/09/2010     Priority: Medium    Bipolar 2 disorder (H) 04/13/2010     Priority: Medium    Other specified idiopathic peripheral neuropathy 01/08/2008     Priority: Medium    Lumbago 01/08/2008     Priority: Medium    Temporomandibular joint disorder 01/08/2008     Priority: Medium     Problem list name updated by automated process. Provider to review      Insomnia 11/02/2007     Priority: Medium     Problem list name updated by automated process. Provider to review          Past Medical History:    Past Medical History:   Diagnosis  "Date    Anxiety     Heroin abuse (H)     Methamphetamine abuse (H)     Schizophrenia (H)        Past Surgical History:    No past surgical history on file.    Family History:    Family History   Problem Relation Age of Onset    Glaucoma Mother     Diabetes No family hx of     C.A.D. No family hx of     Hypertension No family hx of     Cerebrovascular Disease No family hx of     Breast Cancer No family hx of     Cancer - colorectal No family hx of     Prostate Cancer No family hx of     Macular Degeneration No family hx of        Social History:  Marital Status:   [4]  Social History     Tobacco Use    Smoking status: Every Day     Packs/day: .25     Types: Cigarettes    Smokeless tobacco: Current   Substance Use Topics    Alcohol use: No    Drug use: No        Medications:    diazepam (VALIUM) 5 MG tablet  albuterol (PROAIR HFA/PROVENTIL HFA/VENTOLIN HFA) 108 (90 Base) MCG/ACT inhaler  AMBIEN 10 MG OR TABS  AMBIEN 10 MG OR TABS  AMBIEN 10 MG OR TABS  CELEXA 20 MG OR TABS  FLONASE INHA 50 MCG/DOSE NA  folic acid (FOLVITE) 1 MG tablet  gabapentin (NEURONTIN) 600 MG tablet  guaiFENesin-codeine (ROBITUSSIN AC) 100-10 MG/5ML solution  hydrochlorothiazide (HYDRODIURIL) 25 MG tablet  IBUPROFEN 800 MG OR TABS  lamoTRIgine (LAMICTAL) 200 MG tablet  lidocaine (LIDODERM) 5 % patch  methotrexate 2.5 MG tablet  simvastatin (ZOCOR) 20 MG tablet  VYVANSE 10 MG capsule          Review of Systems    Physical Exam   BP: (!) 130/90  Pulse: 82  Temp: 97.9  F (36.6  C)  Resp: 24  Height: 172.7 cm (5' 8\")  Weight: 99.8 kg (220 lb)  SpO2: 99 %      Physical Exam  Nontoxic-appearing no respiratory distress alert and oriented  Skin Pink warm and dry  Lungs are clear  Tenderness right lateral inferior chest no outward sign of trauma chest back or abdomen  Spine nontender  Abdomen soft does not appear tender  ED Course                 Procedures                  Results for orders placed or performed during the hospital encounter of " 12/02/23 (from the past 24 hour(s))   Chest CT w/o contrast    Narrative    EXAM: CT CHEST WITHOUT CONTRAST  LOCATION: [facility]  DATE: 12/02/2023    INDICATION: Fall, right lateral rib pain and tenderness.  COMPARISON: Chest radiograph 11/29/2023.  TECHNIQUE: CT chest without IV contrast. Multiplanar reformats were obtained. Dose reduction techniques were used.  CONTRAST: None.    FINDINGS:   LUNGS AND PLEURA: No focal consolidation, pleural effusion or pneumothorax. Minimal bibasilar atelectasis. Right middle lobe 4 mm nodule (5/149) and left upper lobe 2 mm nodule (5/127).    MEDIASTINUM/AXILLAE: No lymphadenopathy. No thoracic aortic aneurysm. Small amount of debris noted in the esophagus.    CORONARY ARTERY CALCIFICATION: None.    UPPER ABDOMEN: No acute finding. Left mid pole partially exophytic subcentimeter hypodensity is too small to characterize..    MUSCULOSKELETAL: No convincing rib fracture. No aggressive osseous lesion.     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  No convincing rib fracture, pleural effusion or pneumothorax.  2.  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 Clinically correlate.  3.  Small pulmonary nodules measuring up to 4 mm. Recommend follow-up per Fleischner criteria.    REFERENCE:  Guidelines for Management of Incidental Pulmonary Nodules Detected on CT Images: From the Fleischner Society 2017.   Guidelines apply to incidental nodules in patients who are 35 years or older.  Guidelines do not apply to lung cancer screening, patients with immunosuppression, or patients with known primary cancer.             Medications   ibuprofen (ADVIL/MOTRIN) tablet 400 mg (400 mg Oral $Given 12/2/23 0928)   acetaminophen (TYLENOL) tablet 1,000 mg (1,000 mg Oral $Given 12/2/23 0928)   diazepam (VALIUM) tablet 10 mg (10 mg Oral  $Given 12/2/23 1422)       Assessments & Plan (with Medical Decision Making)  47-year-old female with fall several days ago right lateral inferior chest wall pain and tenderness.  Usual differential considered.  CT scan chest without contrast no evidence for rib fracture, other incidental findings including some pulmonary nodules, she can follow-up primary care regarding pulmonary nodules.  Recommend ibuprofen/acetaminophen, diazepam for muscle spasm, feeling better after same in the department.  Return/follow-up criteria reviewed.     I have reviewed the nursing notes.    I have reviewed the findings, diagnosis, plan and need for follow up with the patient.        New Prescriptions    DIAZEPAM (VALIUM) 5 MG TABLET    Take 1-2 tablets (5-10 mg) by mouth every 8 hours as needed for muscle spasms (MUSCLE SPASM)       Final diagnoses:   Fall, initial encounter   Chest wall pain   Pulmonary nodules       12/2/2023   [facility] EMERGENCY DEPT       Karlos Hollingsworth MD  12/02/23 [id number]

## 2023-12-02 NOTE — ED NOTES
"    Pt in fetal position on cot, meir out in pain when I arrived and try to do assessment.  Pt is in pt R flank area, and only with movement.  Pt reports she has been \"perfectly fine\" for 3 days and then last night when she got out of bed, she had severe 10/10 pain.   Pt took tyle, ibuprofen and tramadol at that time, 4am.  Pt states she was here after slipping and falling down 3 steps, landing on her spine.  Pt has been using hot pads, lidocaine patches, and medication for comfort, but states this is different.  Pt currently does not have a lidocaine patch on and states \"those don't work\".        No brusing noted to flank or spine area, pt screams in pain with just touching the skin.  Difficult to assess spine or ribs due to screaming out in pain.   Pt able to move in bed with extreme pain.  Position of comfort is fetal position in L side.     PLAN:  Will medicate for comfort and await MD assessment and orders.     "

## 2024-02-21 ENCOUNTER — HOSPITAL ENCOUNTER (EMERGENCY)
Facility: CLINIC | Age: 49
Discharge: HOME | End: 2024-02-22
Attending: EMERGENCY MEDICINE | Admitting: EMERGENCY MEDICINE
Payer: COMMERCIAL

## 2024-02-21 DIAGNOSIS — F15.10 METHAMPHETAMINE ABUSE (H): ICD-10-CM

## 2024-02-21 DIAGNOSIS — G93.40 ACUTE ENCEPHALOPATHY: ICD-10-CM

## 2024-02-21 DIAGNOSIS — F31.9 BIPOLAR AFFECTIVE DISORDER, REMISSION STATUS UNSPECIFIED (H): ICD-10-CM

## 2024-02-21 DIAGNOSIS — F13.10 BENZODIAZEPINE ABUSE (H): ICD-10-CM

## 2024-02-21 PROBLEM — G40.909 SEIZURE DISORDER (H): Status: ACTIVE | Noted: 2022-03-02

## 2024-02-21 PROBLEM — I10 BENIGN ESSENTIAL HTN: Status: ACTIVE | Noted: 2022-01-30

## 2024-02-21 PROBLEM — F31.60 BIPOLAR DISORDER, MIXED (H): Status: ACTIVE | Noted: 2022-01-30

## 2024-02-21 PROBLEM — M06.30 RHEUMATOID NODULE (H): Status: ACTIVE | Noted: 2024-02-21

## 2024-02-21 PROBLEM — G89.29 CHRONIC PAIN: Status: ACTIVE | Noted: 2024-02-21

## 2024-02-21 PROBLEM — B18.2 CHRONIC HEPATITIS C WITHOUT HEPATIC COMA (H): Status: ACTIVE | Noted: 2023-07-18

## 2024-02-21 PROBLEM — E78.2 MIXED HYPERLIPIDEMIA: Status: ACTIVE | Noted: 2022-01-30

## 2024-02-21 PROBLEM — K21.00 GASTROESOPHAGEAL REFLUX DISEASE WITH ESOPHAGITIS WITHOUT HEMORRHAGE: Status: ACTIVE | Noted: 2022-07-15

## 2024-02-21 PROBLEM — F29 PSYCHOSIS, UNSPECIFIED PSYCHOSIS TYPE (H): Status: ACTIVE | Noted: 2024-02-21

## 2024-02-21 PROBLEM — F51.04 CHRONIC INSOMNIA: Status: ACTIVE | Noted: 2024-02-21

## 2024-02-21 PROBLEM — K21.9 GERD WITHOUT ESOPHAGITIS: Status: ACTIVE | Noted: 2024-02-21

## 2024-02-21 LAB
ALBUMIN SERPL BCG-MCNC: 4.4 G/DL (ref 3.5–5.2)
ALP SERPL-CCNC: 94 U/L (ref 40–150)
ALT SERPL W P-5'-P-CCNC: 25 U/L (ref 0–50)
AMMONIA PLAS-SCNC: 30 UMOL/L (ref 11–51)
ANION GAP SERPL CALCULATED.3IONS-SCNC: 15 MMOL/L (ref 7–15)
APAP SERPL-MCNC: <5 UG/ML (ref 10–30)
AST SERPL W P-5'-P-CCNC: 28 U/L (ref 0–45)
BASOPHILS # BLD AUTO: 0 10E3/UL (ref 0–0.2)
BASOPHILS NFR BLD AUTO: 0 %
BILIRUB SERPL-MCNC: 0.5 MG/DL
BUN SERPL-MCNC: 13.9 MG/DL (ref 6–20)
CALCIUM SERPL-MCNC: 9.6 MG/DL (ref 8.6–10)
CHLORIDE SERPL-SCNC: 106 MMOL/L (ref 98–107)
CREAT SERPL-MCNC: 0.94 MG/DL (ref 0.51–0.95)
DEPRECATED HCO3 PLAS-SCNC: 21 MMOL/L (ref 22–29)
EGFRCR SERPLBLD CKD-EPI 2021: 75 ML/MIN/1.73M2
EOSINOPHIL # BLD AUTO: 0.1 10E3/UL (ref 0–0.7)
EOSINOPHIL NFR BLD AUTO: 1 %
ERYTHROCYTE [DISTWIDTH] IN BLOOD BY AUTOMATED COUNT: 13.2 % (ref 10–15)
ETHANOL SERPL-MCNC: <0.01 G/DL
GLUCOSE SERPL-MCNC: 98 MG/DL (ref 70–99)
HCG SERPL QL: NEGATIVE
HCT VFR BLD AUTO: 37 % (ref 35–47)
HGB BLD-MCNC: 12.2 G/DL (ref 11.7–15.7)
IMM GRANULOCYTES # BLD: 0 10E3/UL
IMM GRANULOCYTES NFR BLD: 0 %
LYMPHOCYTES # BLD AUTO: 1.1 10E3/UL (ref 0.8–5.3)
LYMPHOCYTES NFR BLD AUTO: 16 %
MCH RBC QN AUTO: 28.1 PG (ref 26.5–33)
MCHC RBC AUTO-ENTMCNC: 33 G/DL (ref 31.5–36.5)
MCV RBC AUTO: 85 FL (ref 78–100)
MONOCYTES # BLD AUTO: 0.6 10E3/UL (ref 0–1.3)
MONOCYTES NFR BLD AUTO: 9 %
NEUTROPHILS # BLD AUTO: 5 10E3/UL (ref 1.6–8.3)
NEUTROPHILS NFR BLD AUTO: 74 %
NRBC # BLD AUTO: 0 10E3/UL
NRBC BLD AUTO-RTO: 0 /100
PLATELET # BLD AUTO: 296 10E3/UL (ref 150–450)
POTASSIUM SERPL-SCNC: 3 MMOL/L (ref 3.4–5.3)
PROT SERPL-MCNC: 7.5 G/DL (ref 6.4–8.3)
RBC # BLD AUTO: 4.34 10E6/UL (ref 3.8–5.2)
SALICYLATES SERPL-MCNC: <0.3 MG/DL
SODIUM SERPL-SCNC: 142 MMOL/L (ref 135–145)
TSH SERPL DL<=0.005 MIU/L-ACNC: 1.04 UIU/ML (ref 0.3–4.2)
WBC # BLD AUTO: 6.8 10E3/UL (ref 4–11)

## 2024-02-21 PROCEDURE — 96372 THER/PROPH/DIAG INJ SC/IM: CPT | Performed by: EMERGENCY MEDICINE

## 2024-02-21 PROCEDURE — 99292 CRITICAL CARE ADDL 30 MIN: CPT | Performed by: EMERGENCY MEDICINE

## 2024-02-21 PROCEDURE — 99291 CRITICAL CARE FIRST HOUR: CPT | Performed by: EMERGENCY MEDICINE

## 2024-02-21 PROCEDURE — 250N000011 HC RX IP 250 OP 636: Performed by: EMERGENCY MEDICINE

## 2024-02-21 PROCEDURE — 84703 CHORIONIC GONADOTROPIN ASSAY: CPT | Performed by: EMERGENCY MEDICINE

## 2024-02-21 PROCEDURE — 36415 COLL VENOUS BLD VENIPUNCTURE: CPT | Performed by: EMERGENCY MEDICINE

## 2024-02-21 PROCEDURE — 80175 DRUG SCREEN QUAN LAMOTRIGINE: CPT | Performed by: EMERGENCY MEDICINE

## 2024-02-21 PROCEDURE — 85004 AUTOMATED DIFF WBC COUNT: CPT | Performed by: EMERGENCY MEDICINE

## 2024-02-21 PROCEDURE — 82140 ASSAY OF AMMONIA: CPT | Performed by: EMERGENCY MEDICINE

## 2024-02-21 PROCEDURE — 80179 DRUG ASSAY SALICYLATE: CPT | Performed by: EMERGENCY MEDICINE

## 2024-02-21 PROCEDURE — 80143 DRUG ASSAY ACETAMINOPHEN: CPT | Performed by: EMERGENCY MEDICINE

## 2024-02-21 PROCEDURE — 82077 ASSAY SPEC XCP UR&BREATH IA: CPT | Performed by: EMERGENCY MEDICINE

## 2024-02-21 PROCEDURE — 82040 ASSAY OF SERUM ALBUMIN: CPT | Performed by: EMERGENCY MEDICINE

## 2024-02-21 PROCEDURE — 84443 ASSAY THYROID STIM HORMONE: CPT | Performed by: EMERGENCY MEDICINE

## 2024-02-21 RX ORDER — OLANZAPINE 10 MG/2ML
10 INJECTION, POWDER, FOR SOLUTION INTRAMUSCULAR ONCE
Status: COMPLETED | OUTPATIENT
Start: 2024-02-21 | End: 2024-02-21

## 2024-02-21 RX ADMIN — MIDAZOLAM 3 MG: 1 INJECTION INTRAMUSCULAR; INTRAVENOUS at 20:05

## 2024-02-21 RX ADMIN — OLANZAPINE 10 MG: 10 INJECTION, POWDER, FOR SOLUTION INTRAMUSCULAR at 19:54

## 2024-02-21 ASSESSMENT — ACTIVITIES OF DAILY LIVING (ADL)
ADLS_ACUITY_SCORE: 35

## 2024-02-21 ASSESSMENT — COLUMBIA-SUICIDE SEVERITY RATING SCALE - C-SSRS
2. HAVE YOU ACTUALLY HAD ANY THOUGHTS OF KILLING YOURSELF IN THE PAST MONTH?: NO
6. HAVE YOU EVER DONE ANYTHING, STARTED TO DO ANYTHING, OR PREPARED TO DO ANYTHING TO END YOUR LIFE?: NO
1. IN THE PAST MONTH, HAVE YOU WISHED YOU WERE DEAD OR WISHED YOU COULD GO TO SLEEP AND NOT WAKE UP?: NO

## 2024-02-21 NOTE — ED NOTES
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  Pt was dropped off by coworker per pt.   Pt is pacing in the room, playing with phone, not cooperative with directions and walking out of room into hallway.     with pt at this time.

## 2024-02-21 NOTE — ED TRIAGE NOTES
Pt states she took one xanax today.  Per patient's roommate at the sober states she has taken 10 since yesterday.  Pt's speech is slurred.  Needs to be medically cleared before she can return.     Triage Assessment (Adult)       Row Name 02/21/24 2318          Triage Assessment    Airway WDL WDL        Respiratory WDL    Respiratory WDL WDL        Skin Circulation/Temperature WDL    Skin Circulation/Temperature WDL WDL        Cardiac WDL    Cardiac WDL X;rhythm     Pulse Rate & Regularity tachycardic        Peripheral/Neurovascular WDL    Peripheral Neurovascular WDL WDL        Cognitive/Neuro/Behavioral WDL    Cognitive/Neuro/Behavioral WDL speech  slurred

## 2024-02-21 NOTE — ED PROVIDER NOTES
History     Chief Complaint   Patient presents with    Drug Overdose     HPI  History per patient, a coworker (Ila) who brought her to the emergency department and dose of her history (his social determinant of health), review of [facility] EMR and Care Everywhere EMR, including extensive chart review of prior unremarkable CT head imaging evaluation for altered mental status and confusion 3/8/2021, multiple clinic notes and multiple prior laboratory evaluations.     Tamica Peguero is a 48 year old female who was brought by a coworker from her place of employment, [facility] [employer], a [employer]'s [employer], where she works as a  because she is acting oddly, confused, ataxic and felt to be intoxicated. Coworker reports that she recently got a prescription filled for Xanax and appears to be misusing them and that she has only a short supply of 10 pills left when she should have many more.  She has a history of polysubstance abuse, abuse of methamphetamine and benzodiazepine and history of prior methamphetamine induced delirium.  She has a history of bipolar affect disorder, ? Schizophrenia, seizure disorder, rheumatoid arthritis and hepatitis C.  Her coworker was contacted and reports that Tamica can no longer live and work at the [facility] [employer], although she was medically cleared she could return there tonight and leave tomorrow.  When questioned about methamphetamine yesterday Tamica would not answer this question.  She denies alcohol use.  She denies any recent trauma or head injury.  She denies any infectious signs or symptoms.  She is a poor and unreliable historian, questions have to be asked repeatedly and she is rambling and has difficulty answering questions directly and following directions.    Allergies:  Allergies   Allergen Reactions    Methylpyrrolidone Nausea and Vomiting    Morphine Nausea and Vomiting    No Known Allergies     Oxycodone Nausea and Vomiting    Ibuprofen GI  Disturbance     GI irritation       Problem List:    Patient Active Problem List    Diagnosis Date Noted    Bipolar I disorder with depression (H) 02/21/2024     Priority: Medium    Chronic insomnia 02/21/2024     Priority: Medium    Chronic pain 02/21/2024     Priority: Medium    GERD without esophagitis 02/21/2024     Priority: Medium    Rheumatoid nodule (H) 02/21/2024     Priority: Medium    Psychosis, unspecified psychosis type (H) 02/21/2024     Priority: Medium    Chronic hepatitis C without hepatic coma (H) 07/18/2023     Priority: Medium    Gastroesophageal reflux disease with esophagitis without hemorrhage 07/15/2022     Priority: Medium    Seizure disorder (H) 03/02/2022     Priority: Medium     Last Assessment & Plan:    Formatting of this note might be different from the original.   Continue Lamictal  Formatting of this note might be different from the original.   Last Assessment & Plan:    Formatting of this note might be different from the original.   Continue Lamictal      Tobacco use disorder 02/23/2022     Priority: Medium     Formatting of this note might be different from the original.  She has smoked since she was 14yo      Other hyperlipidemia 01/30/2022     Priority: Medium    Rheumatoid arthritis, seropositive (H) 01/30/2022     Priority: Medium    Benign essential HTN 01/30/2022     Priority: Medium     Formatting of this note is different from the original.      BP Readings from Last 1 Encounters:    07/15/22 116/84       CREATININE (mg/dL)    Date Value    03/10/2021 0.67       No results found for: MICROALBRAND      Bipolar disorder, mixed (H) 01/30/2022     Priority: Medium     Formatting of this note might be different from the original.   7/2022: Sees a therapist and a psychiatrist- [facility].   Xanax 1 mg twice a day as needed.   Gabapentin 600 mg- takes as needed.   Lamotrigine 200mg at bedtime/night.   Ambien 10 mg at bedtime/night.      Mixed hyperlipidemia 01/30/2022      Priority: Medium     Formatting of this note is different from the original.   Simvastatin 40mg.      Last Lipids:   Last Lipids:   Chol: 2022 271    109   HDL: 2022 71   Non-HDL: 2022 200   Chol/HDL Ratio: 2022 3.82   LDL DIRECT: . No results found in past 5 years    LDL CHOLESTEROL (mg/dL)    Date Value    2022 178 (H)       07/15/22    The ASCVD Risk score (Neal ENGLE Jr., et al., 2013) failed to calculate for the following reasons:     The patient has a prior MI or stroke diagnosis      Delirium due to methamphetamine intoxication (H) 03/10/2021     Priority: Medium    NSTEMI (non-ST elevated myocardial infarction) (H) 2020     Priority: Medium    Chest tightness 2020     Priority: Medium    Anemia 2016     Priority: Medium    Encephalopathy acute 2016     Priority: Medium    Hypothermia 2016     Priority: Medium    Neuropathic pain, arm 02/15/2016     Priority: Medium    H/O bipolar disorder 2012     Priority: Medium    CARDIOVASCULAR SCREENING; LDL GOAL LESS THAN 160 2010     Priority: Medium    Methamphetamine abuse (H) 2010     Priority: Medium     Formatting of this note might be different from the original.   2022: She has a history of meth use, last use was 78 days ago as at 2022 visit but she was admitted for rhabdomyolysis 3/1/2022 after Meth use but has not used since then.  Last Assessment & Plan:    Formatting of this note might be different from the original.   Counseling when appropriate      Bipolar 2 disorder (H) 2010     Priority: Medium    PTSD (post-traumatic stress disorder) 2010     Priority: Medium    Other specified idiopathic peripheral neuropathy 2008     Priority: Medium    Lumbago 2008     Priority: Medium    Temporomandibular joint disorder 2008     Priority: Medium     Problem list name updated by automated process. Provider to review      Insomnia 2007      "Priority: Medium     Problem list name updated by automated process. Provider to review          Past Medical History:    Past Medical History:   Diagnosis Date    Anxiety     Heroin abuse (H)     Methamphetamine abuse (H)     Schizophrenia (H)        Past Surgical History:    History reviewed. No pertinent surgical history.    Family History:    Family History   Problem Relation Age of Onset    Glaucoma Mother     Diabetes No family hx of     C.A.D. No family hx of     Hypertension No family hx of     Cerebrovascular Disease No family hx of     Breast Cancer No family hx of     Cancer - colorectal No family hx of     Prostate Cancer No family hx of     Macular Degeneration No family hx of        Social History:  Marital Status:   [4]  Social History     Tobacco Use    Smoking status: Every Day     Packs/day: .25     Types: Cigarettes    Smokeless tobacco: Current   Substance Use Topics    Alcohol use: No    Drug use: No        Medications:    albuterol (PROAIR HFA/PROVENTIL HFA/VENTOLIN HFA) 108 (90 Base) MCG/ACT inhaler  AMBIEN 10 MG OR TABS  AMBIEN 10 MG OR TABS  AMBIEN 10 MG OR TABS  CELEXA 20 MG OR TABS  FLONASE INHA 50 MCG/DOSE NA  folic acid (FOLVITE) 1 MG tablet  gabapentin (NEURONTIN) 600 MG tablet  guaiFENesin-codeine (ROBITUSSIN AC) 100-10 MG/5ML solution  hydrochlorothiazide (HYDRODIURIL) 25 MG tablet  IBUPROFEN 800 MG OR TABS  lamoTRIgine (LAMICTAL) 200 MG tablet  methotrexate 2.5 MG tablet  simvastatin (ZOCOR) 20 MG tablet  VYVANSE 10 MG capsule        Review of Systems  Poor historian. As mentioned in the HPI, in addition focused review of systems was negative.    Physical Exam   BP: (!) 150/100  Pulse: (!) 122  Temp: 98  F (36.7  C)  Resp: 16  Height: 170.2 cm (5' 7\")  SpO2: 98 %      Physical Exam  Vitals and nursing note reviewed.   Constitutional:       General: She is in acute distress (Anxious, restless, standing in the doorway and will not sit on the bed or sit in her room.).      " Appearance: Normal appearance. She is well-developed. She is not ill-appearing, toxic-appearing or diaphoretic.   HENT:      Head: Normocephalic and atraumatic.      Right Ear: External ear normal.      Left Ear: External ear normal.      Nose: Nose normal.      Mouth/Throat:      Mouth: Mucous membranes are moist.   Eyes:      General: No scleral icterus.     Extraocular Movements: Extraocular movements intact.      Conjunctiva/sclera: Conjunctivae normal.      Pupils: Pupils are equal, round, and reactive to light.   Neck:      Trachea: No tracheal deviation.   Cardiovascular:      Rate and Rhythm: Normal rate and regular rhythm.      Heart sounds: Normal heart sounds. No murmur heard.     No friction rub. No gallop.   Pulmonary:      Effort: Pulmonary effort is normal. No respiratory distress.      Breath sounds: Normal breath sounds. No wheezing, rhonchi or rales.   Abdominal:      General: There is no distension.      Palpations: Abdomen is soft.      Tenderness: There is no abdominal tenderness.   Musculoskeletal:         General: No swelling, tenderness or signs of injury. Normal range of motion.      Cervical back: Normal range of motion and neck supple.      Right lower leg: No edema.      Left lower leg: No edema.   Skin:     General: Skin is warm and dry.      Coloration: Skin is not pale.      Findings: No bruising, erythema or rash.   Neurological:      General: No focal deficit present.      Mental Status: She is alert and oriented to person, place, and time.      GCS: GCS eye subscore is 4. GCS verbal subscore is 5. GCS motor subscore is 6.      Cranial Nerves: Facial asymmetry present. No cranial nerve deficit.      Sensory: Sensation is intact. No sensory deficit.      Motor: Motor function is intact. No weakness, tremor, atrophy, abnormal muscle tone or seizure activity.      Coordination: Coordination abnormal.      Gait: Gait abnormal.      Comments: Rambling thickened speech and ataxia  consistent with intoxication.   Psychiatric:         Attention and Perception: She is inattentive. She does not perceive auditory hallucinations.         Mood and Affect: Mood is anxious.         Speech: Speech is slurred and tangential.         Behavior: Behavior is uncooperative and hyperactive.         Thought Content: Thought content is not paranoid. Thought content does not include homicidal or suicidal ideation. Thought content does not include suicidal plan.         Cognition and Memory: Cognition is impaired. Memory is impaired.         Judgment: Judgment is inappropriate.         ED Course           Procedures              Results for orders placed or performed during the hospital encounter of 02/21/24 (from the past 24 hour(s))   CBC with platelets differential    Narrative    The following orders were created for panel order CBC with platelets differential.  Procedure                               Abnormality         Status                     ---------                               -----------         ------                     CBC with platelets and d...[[id number]]                      Final result                 Please view results for these tests on the individual orders.   Comprehensive metabolic panel   Result Value Ref Range    Sodium 142 135 - 145 mmol/L    Potassium 3.0 (L) 3.4 - 5.3 mmol/L    Carbon Dioxide (CO2) 21 (L) 22 - 29 mmol/L    Anion Gap 15 7 - 15 mmol/L    Urea Nitrogen 13.9 6.0 - 20.0 mg/dL    Creatinine 0.94 0.51 - 0.95 mg/dL    GFR Estimate 75 >60 mL/min/1.73m2    Calcium 9.6 8.6 - 10.0 mg/dL    Chloride 106 98 - 107 mmol/L    Glucose 98 70 - 99 mg/dL    Alkaline Phosphatase 94 40 - 150 U/L    AST 28 0 - 45 U/L    ALT 25 0 - 50 U/L    Protein Total 7.5 6.4 - 8.3 g/dL    Albumin 4.4 3.5 - 5.2 g/dL    Bilirubin Total 0.5 <=1.2 mg/dL   Ammonia (on ice)   Result Value Ref Range    Ammonia 30 11 - 51 umol/L   TSH with free T4 reflex   Result Value Ref Range    TSH 1.04 0.30 - 4.20  uIU/mL   Ethyl Alcohol Level   Result Value Ref Range    Alcohol ethyl <0.01 <=0.01 g/dL   Acetaminophen level   Result Value Ref Range    Acetaminophen <5.0 (L) 10.0 - 30.0 ug/mL   Salicylate level   Result Value Ref Range    Salicylate <0.3   mg/dL   HCG qualitative Blood   Result Value Ref Range    hCG Serum Qualitative Negative Negative   CBC with platelets and differential   Result Value Ref Range    WBC Count 6.8 4.0 - 11.0 10e3/uL    RBC Count 4.34 3.80 - 5.20 10e6/uL    Hemoglobin 12.2 11.7 - 15.7 g/dL    Hematocrit 37.0 35.0 - 47.0 %    MCV 85 78 - 100 fL    MCH 28.1 26.5 - 33.0 pg    MCHC 33.0 31.5 - 36.5 g/dL    RDW 13.2 10.0 - 15.0 %    Platelet Count 296 150 - 450 10e3/uL    % Neutrophils 74 %    % Lymphocytes 16 %    % Monocytes 9 %    % Eosinophils 1 %    % Basophils 0 %    % Immature Granulocytes 0 %    NRBCs per 100 WBC 0 <1 /100    Absolute Neutrophils 5.0 1.6 - 8.3 10e3/uL    Absolute Lymphocytes 1.1 0.8 - 5.3 10e3/uL    Absolute Monocytes 0.6 0.0 - 1.3 10e3/uL    Absolute Eosinophils 0.1 0.0 - 0.7 10e3/uL    Absolute Basophils 0.0 0.0 - 0.2 10e3/uL    Absolute Immature Granulocytes 0.0 <=0.4 10e3/uL    Absolute NRBCs 0.0 10e3/uL       Medications   midazolam (VERSED) injection 5 mg (0 mg Intramuscular Hold 2/21/24 2002)   OLANZapine (zyPREXA) injection 10 mg (10 mg Intramuscular $Given 2/21/24 1954)   midazolam (VERSED) injection 3 mg (3 mg Intramuscular $Given 2/21/24 2005)         7:00 PM - I reviewed the case with the DEC  who performed a virtual mental health evaluation.  She also was unable to get any significant information from Tamica in her present state which is favored to be secondary to intoxication, rather than mental illness.  We have yet to obtain labs, including urine drug screen.  Will plan to continue her workup in the ED and have DEC reevaluate her in the morning.    7:50 PM - code 21 called, patient developed increasing agitation and restlessness, would not sit or lie  on the bed and would not allow laboratory evaluation.  Will order Zyprexa.    8:25 PM - Zyprexa 10 mg IM given and Versed 3 mg IM given and she is slowly tiring and becoming sleepy.  Will continue to closely monitor myself, and RN and sitter also closely monitoring her.  30 minutes spent at the bedside with her during the code 21, with multiple ED care team providers and security assisting with code 21 and verbal de-escalation and general physical restraint by staff to keep her in bed.    9:30 PM -sleeping, labs drawn, will continue to carefully observe and monitor.    1:00 AM - still sleeping comfortably, still awaiting urine for tox screen.  Care of the patient signed out to Dr. Chaudhary at the end of my shift.  DEC will reassess her in the morning.  Should she sober and mentation clear to normal and she requests discharge in the a.m. this could be considered as her disposition.    Assessments & Plan (with Medical Decision Making)   48-year-old female with history of bipolar factor disorder, ? schizophrenia, methamphetamine abuse  polysubstance abuse who presents to the emergency department with apparent methamphetamine induced altered mental status, with confusion and acute encephalopathy, and probable benzodiazepine intoxication.  She has been misusing/overusing Xanax which is prescribed her, and had a small glass container and straw in her pocket with white powder residue in the glass container which tested positive for methamphetamine when tested by the [place] [employer] after an officer came to the emergency department to assist us with testing of this.  She has a history of prior methamphetamine induced delirium.  An DONOVAN hold was placed that she arrived incompetent to make medical decisions regarding her care and evaluation and subsequently a code 21 was called because she became increasingly anxious and agitated when directed to a psychiatric room and an attempt at laboratory evaluation was made.  She  was given Zyprexa 10 mg IM and Versed 3 mg IM and then fell asleep and was resting comfortably and asleep, stable and in no distress, at time of my shift ending. Care of the patient was signed out to the overnight physician with plan for continued monitoring, DEC reassessment in the a.m. and possible discharge home should she desire this after her mentation and neurologic status returns to baseline/normal.      I have reviewed the nursing notes.    I have reviewed the findings, diagnosis, plan and need for follow up with the patient.    Medical Decision Making: Critical care  Critical Care:    My initial assessment, based on my review of nursing observations, review of vital signs, focused history, physical exam, review of cardiac rhythm monitor, and discussion with her coworker who brought her to the emergency department for evaluation and who can provide history as to today's events and presentation , established that patient has a dangerous drug overdose and altered mental status, which requires immediate intervention, and therefore She is critically ill.     After the initial assessment, the care team initiated multiple lab tests, initiated medication therapy with parenteral Zyprexa and Versed, and consulted with the DEC mental health care provider who evaluated Tamica  to provide stabilization care. Due to the critical nature of this patient, I reassessed nursing observations, vital signs, physical exam, interpretation of response to interventions and laboratory evaluations, mental status, neurologic status, and respiratory status multiple times prior to She disposition.     Time also spent performing documentation, discussion with consultants, and coordination of care.     Critical care time (excluding teaching time and procedures): 80 minutes.       New Prescriptions    No medications on file       Final diagnoses:   Acute encephalopathy - Probable methamphetamine induced delirium   Methamphetamine abuse  (H)   Benzodiazepine abuse (H)   Bipolar affective disorder, remission status unspecified (H)       2/21/2024   [facility] EMERGENCY DEPT       Kem Ramirez MD  02/22/24 0209

## 2024-02-22 VITALS
HEART RATE: 84 BPM | TEMPERATURE: 98 F | SYSTOLIC BLOOD PRESSURE: 120 MMHG | RESPIRATION RATE: 16 BRPM | HEIGHT: 67 IN | BODY MASS INDEX: 34.46 KG/M2 | DIASTOLIC BLOOD PRESSURE: 94 MMHG | OXYGEN SATURATION: 98 %

## 2024-02-22 LAB
AMPHETAMINES UR QL SCN: ABNORMAL
BARBITURATES UR QL SCN: ABNORMAL
BENZODIAZ UR QL SCN: ABNORMAL
BZE UR QL SCN: ABNORMAL
CANNABINOIDS UR QL SCN: ABNORMAL
FENTANYL UR QL: ABNORMAL
OPIATES UR QL SCN: ABNORMAL
PCP QUAL URINE (ROCHE): ABNORMAL

## 2024-02-22 PROCEDURE — 250N000013 HC RX MED GY IP 250 OP 250 PS 637: Performed by: FAMILY MEDICINE

## 2024-02-22 PROCEDURE — 80307 DRUG TEST PRSMV CHEM ANLYZR: CPT | Performed by: EMERGENCY MEDICINE

## 2024-02-22 RX ORDER — LORAZEPAM 1 MG/1
1 TABLET ORAL ONCE
Status: COMPLETED | OUTPATIENT
Start: 2024-02-22 | End: 2024-02-22

## 2024-02-22 RX ORDER — AMLODIPINE BESYLATE 2.5 MG/1
2.5 TABLET ORAL ONCE
Status: COMPLETED | OUTPATIENT
Start: 2024-02-22 | End: 2024-02-22

## 2024-02-22 RX ORDER — TRAMADOL HYDROCHLORIDE 50 MG/1
50 TABLET ORAL ONCE
Status: COMPLETED | OUTPATIENT
Start: 2024-02-22 | End: 2024-02-22

## 2024-02-22 RX ADMIN — AMLODIPINE BESYLATE 2.5 MG: 2.5 TABLET ORAL at 13:52

## 2024-02-22 RX ADMIN — LORAZEPAM 1 MG: 1 TABLET ORAL at 13:52

## 2024-02-22 RX ADMIN — TRAMADOL HYDROCHLORIDE 50 MG: 50 TABLET, COATED ORAL at 13:52

## 2024-02-22 ASSESSMENT — COLUMBIA-SUICIDE SEVERITY RATING SCALE - C-SSRS
ATTEMPT SINCE LAST CONTACT: NO
2. HAVE YOU ACTUALLY HAD ANY THOUGHTS OF KILLING YOURSELF?: NO
TOTAL  NUMBER OF ABORTED OR SELF INTERRUPTED ATTEMPTS SINCE LAST CONTACT: NO
TOTAL  NUMBER OF INTERRUPTED ATTEMPTS SINCE LAST CONTACT: NO
1. SINCE LAST CONTACT, HAVE YOU WISHED YOU WERE DEAD OR WISHED YOU COULD GO TO SLEEP AND NOT WAKE UP?: NO
6. HAVE YOU EVER DONE ANYTHING, STARTED TO DO ANYTHING, OR PREPARED TO DO ANYTHING TO END YOUR LIFE?: NO

## 2024-02-22 ASSESSMENT — ACTIVITIES OF DAILY LIVING (ADL)
ADLS_ACUITY_SCORE: 35

## 2024-02-22 NOTE — CARE PLAN
02/21/24 2220   Care Path Handoff   Final Disposition / Recommended Care Path observation   Clinical Substantiation Unable to fully assess due to patient's current confusion, restlessness and inability to respond fully to questions asked.   Identified Goals and  Safety Issues Reassess patient in am.   Plan for Care reviewed with assigned Medical Provider yes   Plan for Care Team Review provider

## 2024-02-22 NOTE — DISCHARGE INSTRUCTIONS
Follow up with established providers and supports as scheduled. Continue taking medications as prescribed. Utilize your Critical access hospital mental [facility] crisis team as needed. They are available 24/7. Contact information is listed below.     [facility]  [phone]     Ways to help cope with sobriety:    -- Take prescribed medicines as scheduled  -- Keep follow-up appointments  -- Talk to others about your concerns  -- Get regular exercise  -- Practice deep breathing skills  -- Eat a healthy diet  -- Use community resources, including hotline numbers, Critical access hospital crisis and support meetings  -- Stay sober and avoid places/people/things associated with substance use  --Maintain a daily schedule/routine  --Get at least 7-8 hours of sleep per night  --Create a list 10--20 healthy activities that you can do that are enjoyable and do not involve substance use  --Create daily goals (approx. 1-4 goals) per day and work to achieve them throughout the day.       Free Resources:    [facility] ([facility])  [facility] connects people seeking recovery to resources that help foster and sustain long-term recovery. Whether you are seeking resources for treatment, transportation, housing, job training, education, health care or other pathways to recovery, [facility] is a great place to start.  Phone: 670.955.2989. www.[facility].[employer] (Great listing of all types of recovery and non-recovery related resources)    Alcoholics Anonymous  Phone: 1-268-ALCOHOL  Website: HTTP://WWW.AA.ORG/  [facility] [place] (962-955-4634 or http://[place].org)  [facility] [place] (585-480-8936 or www.aastpaul.org)     Narcotics Anonymous  Phone: 160.372.7545  Website: www.[employer].    [employer] [facility]  [address] [place] [address], [address], [place], [place],  Phone: 112.709.2420  Drop-in Hours: Monday-Friday 9-11:30 am. By appointment at other times.  Provides: [facility] is a drop-in center on the [place] of [place] that provides a safe  space for individuals who are homeless and have a history of chemical use. Sobriety is not a requirement but drugs and alcohol are not allowed on the property.  Services: Non-clients can access drop-in services such as Recovery and Harm Reduction Groups, referrals to case management, community activities, shower facilities, and a pool table. Individuals who are homeless and have chemical health needs may be eligible for enrollment into [facility]'s case management program. Clients and  work together to access benefits, treatment, health care, shelter, and external housing resources.

## 2024-02-22 NOTE — ED NOTES
Pt has full body erratic movements, restless and not cooperating. Code 21 requested due to pt level of restlessness. Verbal order received from provider Dr. Ramirez to administer Zyprexa 10 mg IM. Medication administered, see MAR. Writer, provider, staff [facility] [place], and Gabriela and va Varma and Sarah remain at bedside. Pt remains restless with full body erratic movements. Verbal order obtained from provider Dr. Ramirez to administer Versed 3 mg IM . See MAR. No changes in pt behavior. Staff remained at bedside. Pulse ox placed to pt for monitoring.      During Code 21 provider found 1 blue pill and small residue container of white powder. Provider contacted PD to test substances. Substances given to security to destroy.

## 2024-02-22 NOTE — ED NOTES
Assumed care of Pt, report received.  Pt with 1:1 watch continues.  Pt sleeping soundly.  VSS.    PLAN: Will continue to monitor closely.  DEC when awake.

## 2024-02-22 NOTE — ED PROVIDER NOTES
"     Emergency Department Patient Sign-out     ED Summary and Plan at Sign Out:  Patient is a 48-year-old female presenting to the ED with \"relapse of meth,\" per the patient.  Patient received  Zyprexa and Versed last evening and slept until this morning.    ED MDM:  1316.  DEC consult still pending.  Patient requesting outpatient medications.  I am comfortable providing lorazepam 1 mg oral dose, nifedipine 2.5 mg oral dose, and tramadol 50 mg oral dose.    IMPRESSION:    ICD-10-CM    1. Acute encephalopathy  G93.40     Probable methamphetamine induced delirium      2. Methamphetamine abuse (H)  F15.10       3. Benzodiazepine abuse (H)  F13.10       4. Bipolar affective disorder, remission status unspecified (H)  F31.9                Leo Marie MD  02/22/24 1317    "

## 2024-02-22 NOTE — PROGRESS NOTES
"Triage and Transition Services Extended Care Reassessment     Patient: Tamica goes by \"Tamica,\" uses she/her pronouns  Date of Service: February 22, 2024  Site of Service: [facility] EMERGENCY DEPT      ED06  Patient was seen yes  Mode of Assessment: Virtual: AmWell     Reason for Reassessment: significant behavior change, intoxication, substance use, other (see comment) (observation status)    History of Patient's Original Emergency Room Encounter: Pt was sent in fro her sober house yesterday by staff concerned for behaviors and likely substance. Pt was not able to fully participate in assessment or provide full history.    Current Patient Presentation: Today pt presents as clear, coherent and engaged but somewhat guarded. She does not exhibit any signs/symptoms of psychosis or matthew.    Presentation Summary: Pt is able to provide clear history today. She states that she relapsed yesterday after nearly a year of sobriety. She used methamphetamine and was experiencing disorganized thinking and hallucinations as she typically does when using meth. She denies those symptoms today. States that she did not abuse her prescription Xanax. She denies current concern for MH, denies SI/HI/plan/intent. She is aware that she can no longer stay/work at her sober house and plans to live with her mother.    Changes Observed Since Initial Assessment: decrease in presenting symptoms    Therapeutic Interventions Provided: Engaged in safety planning, Engaged in guided discovery, explored patient's perspectives and helped expand them through socratic dialogue., Provided positive reinforcement for progress towards goals, gains in knowledge, and application of skills previously taught., Worked on relapse prevention planning (review of stressors, early warning signs, written plan to respond to signs, and rehearse plan).    Current Symptoms:   none reported       Mental Status Exam   Affect: Appropriate  Appearance: " Appropriate  Attention Span/Concentration: Attentive  Eye Contact: Engaged, Variable    Fund of Knowledge: Appropriate   Language /Speech Content: Fluent  Language /Speech Volume: Normal  Language /Speech Rate/Productions: Normal  Recent Memory: Intact  Remote Memory: Intact  Mood: Apathetic  Orientation to Person: Yes   Orientation to Place: Yes  Orientation to Time of Day: Yes  Orientation to Date: Yes     Situation (Do they understand why they are here?): Yes  Psychomotor Behavior: Normal  Thought Content: Clear  Thought Form: Intact    Treatment Objective(s) Addressed: rapport building, identifying an appropriate aftercare plan, assessing safety, safety planning, exploring obstacles to safety in the community    Patient Response to Interventions:      Progress Towards Goals:  Patient Reports Symptoms Are: stable    C-SSRS Since Last Contact:   1. Wish to be Dead (Since Last Contact): No  2. Non-Specific Active Suicidal Thoughts (Since Last Contact): No     Actual Attempt (Since Last Contact): No  Has subject engaged in non-suicidal self-injurious behavior? (Since Last Contact): No  Interrupted Attempts (Since Last Contact): No  Aborted or Self-Interrupted Attempt (Since Last Contact): No  Preparatory Acts or Behavior (Since Last Contact): No  Most Lethal Attempt Date:  (n/a)  Actual Lethality/Medical Damage Code (Most Lethal Attempt):  (n/a)  Calculated C-SSRS Risk Score (Since Last Contact): No Risk Indicated    Plan: Final Disposition / Recommended Care Path: discharge  Plan for Care reviewed with assigned Medical Provider: yes  Plan for Care Team Review: provider  Patient and/or validated legal guardian concurs: yes  Clinical Substantiation: Pt seen in ED for behaviors/symptoms associated with use of methamphetamine which has now resolved. Denies current MH symptoms and does not present with immediate risk of harm to self/others. She commits to safety. She declines offer for MH or YESSENIA referrals but was  provided with general resources, states that she has sober supports in place.    Legal Status: Legal Status at Admission: Voluntary/Patient has signed consent for treatment    Session Status: Time session started: 1318  Time session ended: 1334  Session Duration (minutes): 16 minutes  Session Number: 1  Anticipated number of sessions or this episode of care: 1    Session Start Time: 1318  Session Stop Time: 1334  CPT codes: 91775 - Psychotherapy (with patient) - 30 (16-37*) min  Time Spent: 16 minutes      CPT code(s) utilized: 70564 - Psychotherapy (with patient) - 30 (16-37*) min    Diagnosis:   Patient Active Problem List   Diagnosis Code    Insomnia G47.00    Other specified idiopathic peripheral neuropathy G60.8    Lumbago M54.50    Temporomandibular joint disorder M26.609    CARDIOVASCULAR SCREENING; LDL GOAL LESS THAN 160 Z13.6    Anemia D64.9    Bipolar 2 disorder (H) F31.81    Chest tightness R07.89    Delirium due to methamphetamine intoxication (H) F15.921    Encephalopathy acute G93.40    Hypothermia T68.XXXA    NSTEMI (non-ST elevated myocardial infarction) (H) I21.4    Other hyperlipidemia E78.49    Rheumatoid arthritis, seropositive (H) M05.9    Tobacco use disorder F17.200    Benign essential HTN I10    Bipolar I disorder with depression (H) F31.9    Chronic hepatitis C without hepatic coma (H) B18.2    Gastroesophageal reflux disease with esophagitis without hemorrhage K21.00    PTSD (post-traumatic stress disorder) F43.10    Neuropathic pain, arm M79.2    Methamphetamine abuse (H) F15.10    Chronic insomnia F51.04    Seizure disorder (H) G40.909    Bipolar disorder, mixed (H) F31.60    Chronic pain G89.29    GERD without esophagitis K21.9    H/O bipolar disorder Z86.59    Rheumatoid nodule (H) M06.30    Mixed hyperlipidemia E78.2    Psychosis, unspecified psychosis type (H) F29       Primary Problem This Admission: Active Hospital Problems    Psychosis, unspecified psychosis type (H)      Pooja FALCON  Charles, [facility]   Licensed Mental Health Professional (LMHP), [facility]  [phone]

## 2024-02-22 NOTE — ED NOTES
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   Called Jaxon Bob (pt's demographic number) who is a staff member at facility.   Per Jaxon, staff was concerned about pt's \"erratic\" behaviors today including swaying, packng, not making sense, ripping cigarettes in half.   Jaxon also reports pt had benzo's filled 2 days ago with 10 xanax missing and concerned that pt possibly might be using meth.  Staff called police and pt refused transfer to hospital but allowed Jaxon to drop patient off in ED.      House owner, Jaxon Galeano, [phone] will allow patient to spend 1 night in the house if medically cleared by provider.   Pt has car at facility but does not have a place to stay.  Family members do not want to take patient into their homes per Jaxon Roman.      " Never smoker

## 2024-02-22 NOTE — CONSULTS
Diagnostic Evaluation Consultation  Crisis Assessment    Patient Name: Tamica Peguero  Age:  48 year old  Legal Sex: female  Gender Identity: female  Pronouns:   Race: White  Ethnicity: Not  or   Language: English      Patient was assessed: Virtual: [employer] Crisis Assessment Start Time: 1823 Crisis Assessment Stop Time: 1859  Patient location: [facility] EMERGENCY DEPT                             ED06    Referral Data and Chief Complaint  Tamica Peguero presents to the ED with family/friends. Patient is presenting to the ED for the following concerns: Significant behavioral change, Other (see comment).   Factors that make the mental health crisis life threatening or complex are:  Tamica was dropped off at the ED by other staff where she worked and possibly lived.  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  At the beginning of the visit, Tamica started to verbalize that she was asked to get her things and then words would trail off and she was difficult to understand.  This occurred throughout the visit.  Patient did not want to get off of the video visit because she wanted to be cleared to discharge but was not able to answer questions. She would mumble the questions under her breath and then not be able to come up with a response.  She was restless throughout the visit.  Per chart review collateral contact questioned substance use.  Patient reported that she did not drink alcohol but when asked about marijuana or methamphetamine use she did not respond..      Informed Consent and Assessment Methods  Explained the crisis assessment process, including applicable information disclosures and limits to confidentiality, assessed understanding of the process, and obtained consent to proceed with the assessment.  Assessment methods included conducting a formal interview with patient, review of medical records, collaboration with medical staff, and obtaining relevant  collateral information from family and community providers when available.  :       Patient response to interventions: no evidence of understanding  Coping skills were attempted to reduce the crisis:        History of the Crisis   Tamica was unable to provide information about her mental health history.    Brief Psychosocial History  Family:   , Children yes (3 Adult Children)  Support System:   (Patient verbalized being worried about her Mom and Adult children)  Employment Status:   (Per chart review was transported from her job today due to concerns of substance use)  Source of Income:   (Unable to assess)  Financial Environmental Concerns:   (Unable to assess)  Current Hobbies:   (Unable to assess)  Barriers in Personal Life:   (Unable to assess)    Significant Clinical History  Current Anxiety Symptoms:  anxious  Current Depression/Trauma:     Current Somatic Symptoms:  wandering, sweating, flushing, shaking, anxious  Current Psychosis/Thought Disturbance:  inattentive, hyperactive  Current Eating Symptoms:  loss of appetite  Chemical Use History:  Alcohol: None  Last Use:: 02/21/24 (Reported that she took one earlier today.)  Opiates: None  Cocaine: None  Marijuana:  (Patient did not respond)  Other Use:  (Patient did not respond)  Withdrawal Symptoms: Other (comments) (Feeling Hot)   Past diagnosis:   (Unable to assess)  Family history:   (unable to assess)  Past treatment:   (Unable to assess)  Details of most recent treatment:  Tamica was unable to respond to question  Other relevant history:          Collateral Information    Is there collateral information:   Not completed due to patient did not want collateral contacted.     Collateral information name, relationship, phone number:       What happened today:       What is different about patient's functioning:       Concern about alcohol/drug use:      What do you think the patient needs:      Has patient made comments about wanting to kill  themselves/others:      If d/c is recommended, can they take part in safety/aftercare planning:     Additional collateral information:        Risk Assessment  [place] Suicide Severity Rating Scale Full Clinical Version:  Suicidal Ideation  Q6 Suicide Behavior (Lifetime): no       [place] Suicide Severity Rating Scale Recent:   Suicidal Ideation (Recent)  Q1 Wished to be Dead (Past Month):  (Unable to assess, patient did not respond to question)  Q2 Suicidal Thoughts (Past Month):  (Unable to assess patient did not respond to question)  Level of Risk per Screen: no risks indicated     Environmental or Psychosocial Events:  (Unable to assess)  Protective Factors: Protective Factors:  (Unable to assess)    Does the patient have thoughts of harming others? Current presentation: Confused, Irritable  Is the patient engaging in sexually inappropriate behavior?:  (Unable to assess)  Duty to warn initiated:  (Unable to assess)    Is the patient engaging in sexually inappropriate behavior?   (Unable to assess)        Mental Status Exam   Affect: Labile  Appearance: Disheveled  Attention Span/Concentration: Inattentive  Eye Contact: Variable    Fund of Knowledge: Delayed   Language /Speech Content: Non-Fluent  Language /Speech Volume: Soft  Language /Speech Rate/Productions: Minimally Responsive  Recent Memory: Poor  Remote Memory: Poor  Mood: Irritable, Anxious  Orientation to Person: Yes   Orientation to Place: Yes  Orientation to Time of Day: No  Orientation to Date: No     Situation (Do they understand why they are here?): No  Psychomotor Behavior: Agitated  Thought Content: Paranoia  Thought Form: Paranoia     Mini-Cog Assessment  Number of Words Recalled:    Clock-Drawing Test:     Three Item Recall:    Mini-Cog Total Score:       Medication  Psychotropic medications:   Medication Orders - Psychiatric (From admission, onward)      Start     Dose/Rate Route Frequency Ordered Stop    02/21/24 2000  midazolam (VERSED)  injection 5 mg         5 mg  over 2 Minutes Intramuscular ONCE 02/21/24 1958            Current Care Team  Patient Care Team:  Debbie Forbes MD as PCP - General (Family Medicine)  Yakov Tang Chi, OD as MD (Optometry)  Luann Live MD as Assigned Musculoskeletal Provider    Diagnosis  Patient Active Problem List   Diagnosis Code    Insomnia G47.00    Other specified idiopathic peripheral neuropathy G60.8    Lumbago M54.50    Temporomandibular joint disorder M26.609    CARDIOVASCULAR SCREENING; LDL GOAL LESS THAN 160 Z13.6    Anemia D64.9    Bipolar 2 disorder (H) F31.81    Chest tightness R07.89    Delirium due to methamphetamine intoxication (H) F15.921    Encephalopathy acute G93.40    Hypothermia T68.XXXA    NSTEMI (non-ST elevated myocardial infarction) (H) I21.4    Other hyperlipidemia E78.49    Rheumatoid arthritis, seropositive (H) M05.9    Tobacco use disorder F17.200    Benign essential HTN I10    Bipolar I disorder with depression (H) F31.9    Chronic hepatitis C without hepatic coma (H) B18.2    Gastroesophageal reflux disease with esophagitis without hemorrhage K21.00    PTSD (post-traumatic stress disorder) F43.10    Neuropathic pain, arm M79.2    Methamphetamine abuse (H) F15.10    Chronic insomnia F51.04    Seizure disorder (H) G40.909    Bipolar disorder, mixed (H) F31.60    Chronic pain G89.29    GERD without esophagitis K21.9    H/O bipolar disorder Z86.59    Rheumatoid nodule (H) M06.30    Mixed hyperlipidemia E78.2    Psychosis, unspecified psychosis type (H) F29     Primary Problem This Admission  Active Hospital Problems    Psychosis, unspecified psychosis type (H)      Clinical Summary and Substantiation of Recommendations   Unable to fully assess due to patient's current confusion, restlessness and inability to respond fully to questions asked.    Patient coping skills attempted to reduce the crisis:       Disposition  Recommended disposition: Observation        Reviewed  case and recommendations with attending provider. Attending Name: Dr. Ramirez       Attending concurs with disposition: yes       Patient and/or validated legal guardian concurs with disposition:   yes       Final disposition:  observation    Legal status on admission:      Assessment Details   Total duration spent with the patient: 36 min     CPT code(s) utilized: 79298 - Psychotherapy for Crisis - 60 (30-74*) min    SUSANNA RIOS [facility], Psychotherapist  DEC - Triage & Transition Services  Callback: [phone]

## 2024-02-24 ENCOUNTER — TELEPHONE (OUTPATIENT)
Dept: EMERGENCY MEDICINE | Facility: CLINIC | Age: 49
End: 2024-02-24
Payer: COMMERCIAL

## 2024-02-24 LAB — LAMOTRIGINE SERPL-MCNC: 1.8 UG/ML

## 2024-02-24 NOTE — TELEPHONE ENCOUNTER
[facility]    Reason for call: Lab Result Notification     Lab Result (including Rx patient on, if applicable).  If culture, copy of lab report at bottom.  Lab Result: Low Lamictal Level    Creatinine Level (mg/dl)   Creatinine   Date Value Ref Range Status   02/21/2024 0.94 0.51 - 0.95 mg/dL Final   03/09/2006 1.00 0.60 - 1.30 mg/dL Final    Creatinine clearance (ml/min), if applicable    Serum creatinine: 0.94 mg/dL 02/21/24 2131  Estimated creatinine clearance: 89.8 mL/min     Patient's current Symptoms:   Left voicemail message requesting a call back to [facility] ED Lab Result RN at [phone]. RN is available every day between 9 a.m. and 5:30 p.m.     Jamie Syed RN     Component      Latest Ref Rng 2/21/2024  9:31 PM   Lamotrigine      3.0 - 15.0 ug/mL 1.8 (L)       Legend:  (L) Low

## 2024-07-30 ENCOUNTER — HOSPITAL ENCOUNTER (EMERGENCY)
Facility: CLINIC | Age: 49
Discharge: HOME | End: 2024-07-30
Payer: COMMERCIAL

## 2024-07-30 ENCOUNTER — APPOINTMENT (OUTPATIENT)
Dept: GENERAL RADIOLOGY | Facility: CLINIC | Age: 49
End: 2024-07-30
Payer: COMMERCIAL

## 2024-07-30 VITALS
TEMPERATURE: 97.7 F | DIASTOLIC BLOOD PRESSURE: 98 MMHG | HEART RATE: 83 BPM | OXYGEN SATURATION: 98 % | SYSTOLIC BLOOD PRESSURE: 133 MMHG | RESPIRATION RATE: 18 BRPM

## 2024-07-30 DIAGNOSIS — J40 BRONCHITIS: ICD-10-CM

## 2024-07-30 PROCEDURE — 71046 X-RAY EXAM CHEST 2 VIEWS: CPT

## 2024-07-30 PROCEDURE — G0463 HOSPITAL OUTPT CLINIC VISIT: HCPCS | Mod: 25

## 2024-07-30 PROCEDURE — 99214 OFFICE O/P EST MOD 30 MIN: CPT

## 2024-07-30 RX ORDER — BENZONATATE 100 MG/1
100 CAPSULE ORAL 3 TIMES DAILY PRN
Qty: 15 CAPSULE | Refills: 0 | Status: SHIPPED | OUTPATIENT
Start: 2024-07-30 | End: 2024-08-04

## 2024-07-30 RX ORDER — PREDNISONE 20 MG/1
TABLET ORAL
Qty: 10 TABLET | Refills: 0 | Status: SHIPPED | OUTPATIENT
Start: 2024-07-30

## 2024-07-30 RX ORDER — AZITHROMYCIN 250 MG/1
TABLET, FILM COATED ORAL
Qty: 6 TABLET | Refills: 0 | Status: SHIPPED | OUTPATIENT
Start: 2024-07-30 | End: 2024-08-04

## 2024-07-30 ASSESSMENT — ACTIVITIES OF DAILY LIVING (ADL): ADLS_ACUITY_SCORE: 35

## 2024-10-13 ENCOUNTER — HEALTH MAINTENANCE LETTER (OUTPATIENT)
Age: 49
End: 2024-10-13

## 2024-11-24 ENCOUNTER — HOSPITAL ENCOUNTER (EMERGENCY)
Facility: CLINIC | Age: 49
Discharge: HOME | End: 2024-11-24
Attending: PHYSICIAN ASSISTANT | Admitting: PHYSICIAN ASSISTANT
Payer: COMMERCIAL

## 2024-11-24 VITALS
OXYGEN SATURATION: 97 % | DIASTOLIC BLOOD PRESSURE: 86 MMHG | TEMPERATURE: 97 F | RESPIRATION RATE: 16 BRPM | SYSTOLIC BLOOD PRESSURE: 124 MMHG | HEART RATE: 80 BPM

## 2024-11-24 DIAGNOSIS — K08.89 PAIN, DENTAL: ICD-10-CM

## 2024-11-24 PROCEDURE — G0463 HOSPITAL OUTPT CLINIC VISIT: HCPCS | Performed by: PHYSICIAN ASSISTANT

## 2024-11-24 PROCEDURE — 99213 OFFICE O/P EST LOW 20 MIN: CPT | Performed by: PHYSICIAN ASSISTANT

## 2024-11-24 RX ORDER — AMOXICILLIN 875 MG/1
875 TABLET, COATED ORAL 2 TIMES DAILY
Qty: 14 TABLET | Refills: 0 | Status: SHIPPED | OUTPATIENT
Start: 2024-11-24 | End: 2024-12-01

## 2024-11-24 RX ORDER — HYDROCODONE BITARTRATE AND ACETAMINOPHEN 5; 325 MG/1; MG/1
1 TABLET ORAL EVERY 6 HOURS PRN
Qty: 2 TABLET | Refills: 0 | Status: SHIPPED | OUTPATIENT
Start: 2024-11-24 | End: 2024-11-27

## 2024-11-24 ASSESSMENT — COLUMBIA-SUICIDE SEVERITY RATING SCALE - C-SSRS
6. HAVE YOU EVER DONE ANYTHING, STARTED TO DO ANYTHING, OR PREPARED TO DO ANYTHING TO END YOUR LIFE?: NO
2. HAVE YOU ACTUALLY HAD ANY THOUGHTS OF KILLING YOURSELF IN THE PAST MONTH?: NO
1. IN THE PAST MONTH, HAVE YOU WISHED YOU WERE DEAD OR WISHED YOU COULD GO TO SLEEP AND NOT WAKE UP?: NO

## 2024-11-24 ASSESSMENT — ACTIVITIES OF DAILY LIVING (ADL): ADLS_ACUITY_SCORE: 0

## 2024-11-24 NOTE — ED TRIAGE NOTES
Pt reports she was supposed to have root canal last month and then chose not to do so. Pt having dental pain 1-2 days

## 2024-11-24 NOTE — ED PROVIDER NOTES
History     Chief Complaint   Patient presents with    Dental Pain     HPI  Tamica Peguero is a 48 year old female who presents to urgent care with concern over right lower dental pain present present for last 24 hours.  Patient reports that she was evaluated [facility] previously and was instructed to have root canal last month however she did not follow through due to pain.  Recently pain has intensified significantly.  Exacerbated by movement of her jaw, palpation.  Pain radiate to her ear.  She also complains of warmth to the right side of her face, possible facial swelling.  She denies any fever, nasal congestion, cough, dyspnea wheezing or abdominal pain ***    Allergies:  Allergies   Allergen Reactions    Methylpyrrolidone Nausea and Vomiting    Morphine Nausea and Vomiting    No Known Allergies     Oxycodone Nausea and Vomiting    Ibuprofen GI Disturbance     GI irritation     Problem List:    Patient Active Problem List    Diagnosis Date Noted    Bipolar I disorder with depression (H) 02/21/2024     Priority: Medium    Chronic insomnia 02/21/2024     Priority: Medium    Chronic pain 02/21/2024     Priority: Medium    GERD without esophagitis 02/21/2024     Priority: Medium    Rheumatoid nodule (H) 02/21/2024     Priority: Medium    Psychosis, unspecified psychosis type (H) 02/21/2024     Priority: Medium    Chronic hepatitis C without hepatic coma (H) 07/18/2023     Priority: Medium    Gastroesophageal reflux disease with esophagitis without hemorrhage 07/15/2022     Priority: Medium    Seizure disorder (H) 03/02/2022     Priority: Medium     Last Assessment & Plan:    Formatting of this note might be different from the original.   Continue Lamictal  Formatting of this note might be different from the original.   Last Assessment & Plan:    Formatting of this note might be different from the original.   Continue Lamictal      Tobacco use disorder 02/23/2022     Priority: Medium     Formatting of  this note might be different from the original.  She has smoked since she was 14yo      Other hyperlipidemia 2022     Priority: Medium    Rheumatoid arthritis, seropositive (H) 2022     Priority: Medium    Benign essential HTN 2022     Priority: Medium     Formatting of this note is different from the original.      BP Readings from Last 1 Encounters:    07/15/22 116/84       CREATININE (mg/dL)    Date Value    03/10/2021 0.67       No results found for: MICROALBRAND      Bipolar disorder, mixed (H) 2022     Priority: Medium     Formatting of this note might be different from the original.   2022: Sees a therapist and a psychiatrist- [facility].   Xanax 1 mg twice a day as needed.   Gabapentin 600 mg- takes as needed.   Lamotrigine 200mg at bedtime/night.   Ambien 10 mg at bedtime/night.      Mixed hyperlipidemia 2022     Priority: Medium     Formatting of this note is different from the original.   Simvastatin 40mg.      Last Lipids:   Last Lipids:   Chol: 2022 271    109   HDL: 2022 71   Non-HDL: 2022 200   Chol/HDL Ratio: 2022 3.82   LDL DIRECT: . No results found in past 5 years    LDL CHOLESTEROL (mg/dL)    Date Value    2022 178 (H)       07/15/22    The ASCVD Risk score (Neal ENGLE Jr., et al., 2013) failed to calculate for the following reasons:     The patient has a prior MI or stroke diagnosis      Delirium due to methamphetamine intoxication (H) 03/10/2021     Priority: Medium    NSTEMI (non-ST elevated myocardial infarction) (H) 2020     Priority: Medium    Chest tightness 2020     Priority: Medium    Anemia 2016     Priority: Medium    Encephalopathy acute 2016     Priority: Medium    Hypothermia 2016     Priority: Medium    Neuropathic pain, arm 02/15/2016     Priority: Medium    H/O bipolar disorder 2012     Priority: Medium    CARDIOVASCULAR SCREENING; LDL GOAL LESS THAN 160 2010      Priority: Medium    Methamphetamine abuse (H) 04/23/2010     Priority: Medium     Formatting of this note might be different from the original.   7/2022: She has a history of meth use, last use was 78 days ago as at 1/2022 visit but she was admitted for rhabdomyolysis 3/1/2022 after Meth use but has not used since then.  Last Assessment & Plan:    Formatting of this note might be different from the original.   Counseling when appropriate      Bipolar 2 disorder (H) 04/13/2010     Priority: Medium    PTSD (post-traumatic stress disorder) 04/13/2010     Priority: Medium    Other specified idiopathic peripheral neuropathy 01/08/2008     Priority: Medium    Lumbago 01/08/2008     Priority: Medium    Temporomandibular joint disorder 01/08/2008     Priority: Medium     Problem list name updated by automated process. Provider to review      Insomnia 11/02/2007     Priority: Medium     Problem list name updated by automated process. Provider to review          Past Medical History:    Past Medical History:   Diagnosis Date    Anxiety     Heroin abuse (H)     Methamphetamine abuse (H)     Schizophrenia (H)        Past Surgical History:    No past surgical history on file.    Family History:    Family History   Problem Relation Age of Onset    Glaucoma Mother     Diabetes No family hx of     C.A.D. No family hx of     Hypertension No family hx of     Cerebrovascular Disease No family hx of     Breast Cancer No family hx of     Cancer - colorectal No family hx of     Prostate Cancer No family hx of     Macular Degeneration No family hx of        Social History:  Marital Status:   [4]  Social History     Tobacco Use    Smoking status: Every Day     Current packs/day: 0.25     Types: Cigarettes    Smokeless tobacco: Current   Substance Use Topics    Alcohol use: No    Drug use: No        Medications:    albuterol (PROAIR HFA/PROVENTIL HFA/VENTOLIN HFA) 108 (90 Base) MCG/ACT inhaler  AMBIEN 10 MG OR TABS  AMBIEN 10 MG OR  "TABS  AMBIEN 10 MG OR TABS  amoxicillin (AMOXIL) 875 MG tablet  CELEXA 20 MG OR TABS  FLONASE INHA 50 MCG/DOSE NA  folic acid (FOLVITE) 1 MG tablet  gabapentin (NEURONTIN) 600 MG tablet  guaiFENesin-codeine (ROBITUSSIN AC) 100-10 MG/5ML solution  hydrochlorothiazide (HYDRODIURIL) 25 MG tablet  IBUPROFEN 800 MG OR TABS  lamoTRIgine (LAMICTAL) 200 MG tablet  methotrexate 2.5 MG tablet  predniSONE (DELTASONE) 20 MG tablet  simvastatin (ZOCOR) 20 MG tablet  VYVANSE 10 MG capsule          Review of Systems    Physical Exam   BP: 124/86  Pulse: 80  Temp: 97  F (36.1  C)  Resp: 16  SpO2: 97 %      Physical Exam    ED Course        Procedures         {EKG done?:523546}    Critical Care time:  {none or minutes:267243}  {Trauma Activation or Fall?:877912}  {Sepsis/Septic Shock/Stemi/Stroke:031674}         No results found for this or any previous visit (from the past 24 hours).    Medications - No data to display    Assessments & Plan (with Medical Decision Making)     I have reviewed the nursing notes.    I have reviewed the findings, diagnosis, plan and need for follow up with the patient.  {ED Addendum:894283::\" \"}        Medical Decision Making  The patient's presentation was of {[facility] Problem:325100}.    The patient's evaluation involved:  {[facility] Data:943186}    The patient's management necessitated {[facility] Management:951344}.        New Prescriptions    AMOXICILLIN (AMOXIL) 875 MG TABLET    Take 1 tablet (875 mg) by mouth 2 times daily for 7 days.       Final diagnoses:   Pain, dental       11/24/2024   [facility] EMERGENCY DEPT    "

## 2024-11-25 ENCOUNTER — HOSPITAL ENCOUNTER (EMERGENCY)
Facility: CLINIC | Age: 49
Discharge: HOME | End: 2024-11-25
Attending: EMERGENCY MEDICINE | Admitting: EMERGENCY MEDICINE
Payer: COMMERCIAL

## 2024-11-25 VITALS
DIASTOLIC BLOOD PRESSURE: 109 MMHG | OXYGEN SATURATION: 97 % | WEIGHT: 200 LBS | RESPIRATION RATE: 20 BRPM | BODY MASS INDEX: 30.31 KG/M2 | HEART RATE: 73 BPM | HEIGHT: 68 IN | SYSTOLIC BLOOD PRESSURE: 152 MMHG | TEMPERATURE: 96.9 F

## 2024-11-25 DIAGNOSIS — K05.219 PERIODONTAL ABSCESS: ICD-10-CM

## 2024-11-25 PROCEDURE — 250N000011 HC RX IP 250 OP 636: Performed by: EMERGENCY MEDICINE

## 2024-11-25 PROCEDURE — 41800 DRAINAGE OF GUM LESION: CPT | Performed by: EMERGENCY MEDICINE

## 2024-11-25 PROCEDURE — 99283 EMERGENCY DEPT VISIT LOW MDM: CPT | Mod: 25 | Performed by: EMERGENCY MEDICINE

## 2024-11-25 RX ORDER — OXYCODONE HYDROCHLORIDE 5 MG/1
5 TABLET ORAL EVERY 6 HOURS PRN
Qty: 10 TABLET | Refills: 0 | Status: SHIPPED | OUTPATIENT
Start: 2024-11-25

## 2024-11-25 RX ORDER — ONDANSETRON 4 MG/1
4 TABLET, ORALLY DISINTEGRATING ORAL ONCE
Status: COMPLETED | OUTPATIENT
Start: 2024-11-25 | End: 2024-11-25

## 2024-11-25 RX ADMIN — ONDANSETRON 4 MG: 4 TABLET, ORALLY DISINTEGRATING ORAL at 12:23

## 2024-11-25 ASSESSMENT — COLUMBIA-SUICIDE SEVERITY RATING SCALE - C-SSRS
6. HAVE YOU EVER DONE ANYTHING, STARTED TO DO ANYTHING, OR PREPARED TO DO ANYTHING TO END YOUR LIFE?: NO
1. IN THE PAST MONTH, HAVE YOU WISHED YOU WERE DEAD OR WISHED YOU COULD GO TO SLEEP AND NOT WAKE UP?: NO
2. HAVE YOU ACTUALLY HAD ANY THOUGHTS OF KILLING YOURSELF IN THE PAST MONTH?: NO

## 2024-11-25 ASSESSMENT — ACTIVITIES OF DAILY LIVING (ADL): ADLS_ACUITY_SCORE: 41

## 2024-11-25 NOTE — DISCHARGE INSTRUCTIONS
Continue taking the antibiotics.  Return for significant worsening symptoms, difficulty breathing, difficulty swallowing, repeated vomiting, or other concerns.  Get rechecked if not starting to improve over the next 48 hours.  Follow-up with your dentist.    Use ibuprofen and acetaminophen for your symptoms.  Use oxycodone as needed for pain that is not controlled by the prior two medications.    Oxycodone is an addictive opioid medication, please only take it when the pain is more than can be controlled by acetaminophen or ibuprofen alone. It will also make you lightheaded, nauseated, and constipated.  Do not drive, operate heavy machinery, or take care of young children while taking this medication. Do not mix it with other medications or drugs that will make you sleepy, such as alcohol.     Repeated studies have shown that the longer you use opioid pain medications, the longer it is until you return to normal function. It is our recommendation that you taper off opioids as quickly as possible with the goal of returning to normal function or near normal function. Long term use of opioids quickly results in growing tolerance to the medication (less of the benefits) and increased dependence (more of the bad side effects).     Pain is very difficult to treat and we can very rarely take away pain completely. If you are having difficulty with pain over several weeks after an injury, you may need to start different medications and therapies (such as physical therapy, graded exercise, massage, and acupuncture). Please talk about this with your regular doctor.     If you are interested in seeking free, confidential treatment referral and information service for individuals and families facing mental health and/or substance use disorders please call [phone] ([phone])

## 2024-11-25 NOTE — ED TRIAGE NOTES
Right sided tooth pain/abscess. Seen in UC yesterday-took abx and pain medications, swelling to face and pain worse today.      Triage Assessment (Adult)       Row Name 11/25/24 1138          Triage Assessment    Airway WDL WDL        Respiratory WDL    Respiratory WDL WDL        Skin Circulation/Temperature WDL    Skin Circulation/Temperature WDL WDL        Cardiac WDL    Cardiac WDL WDL        Cognitive/Neuro/Behavioral WDL    Cognitive/Neuro/Behavioral WDL WDL

## 2024-11-25 NOTE — ED PROVIDER NOTES
History     Chief Complaint   Patient presents with    Dental Pain     Right sided tooth pain/abscess. Seen in UC yesterday-took abx and pain medications, swelling to face and pain worse today.     Facial Swelling     Right sided-abscess      HPI  Tamica Peguero is a 48 year old female who presents for tooth pain.  Pain present for past several days, much worse overnight into this morning.  Localized to right lower jaw.  She does have associated facial swelling, but no difficulty swallowing or fever.  She was seen for this last evening and started on amoxicillin and given a course of hydrocodone just acetaminophen, I reviewed that her kitchen care visit from 10/24/2024.  She does have dental follow up arranged.  No other complaints.       Allergies:  Allergies   Allergen Reactions    Methylpyrrolidone Nausea and Vomiting    Morphine Nausea and Vomiting    No Known Allergies     Oxycodone Nausea and Vomiting    Ibuprofen GI Disturbance     GI irritation       Problem List:    Patient Active Problem List    Diagnosis Date Noted    Bipolar I disorder with depression (H) 02/21/2024     Priority: Medium    Chronic insomnia 02/21/2024     Priority: Medium    Chronic pain 02/21/2024     Priority: Medium    GERD without esophagitis 02/21/2024     Priority: Medium    Rheumatoid nodule (H) 02/21/2024     Priority: Medium    Psychosis, unspecified psychosis type (H) 02/21/2024     Priority: Medium    Chronic hepatitis C without hepatic coma (H) 07/18/2023     Priority: Medium    Gastroesophageal reflux disease with esophagitis without hemorrhage 07/15/2022     Priority: Medium    Seizure disorder (H) 03/02/2022     Priority: Medium     Last Assessment & Plan:    Formatting of this note might be different from the original.   Continue Lamictal  Formatting of this note might be different from the original.   Last Assessment & Plan:    Formatting of this note might be different from the original.   Continue Lamictal       Tobacco use disorder 2022     Priority: Medium     Formatting of this note might be different from the original.  She has smoked since she was 12yo      Other hyperlipidemia 2022     Priority: Medium    Rheumatoid arthritis, seropositive (H) 2022     Priority: Medium    Benign essential HTN 2022     Priority: Medium     Formatting of this note is different from the original.      BP Readings from Last 1 Encounters:    07/15/22 116/84       CREATININE (mg/dL)    Date Value    03/10/2021 0.67       No results found for: MICROALBRAND      Bipolar disorder, mixed (H) 2022     Priority: Medium     Formatting of this note might be different from the original.   2022: Sees a therapist and a psychiatrist- [facility].   Xanax 1 mg twice a day as needed.   Gabapentin 600 mg- takes as needed.   Lamotrigine 200mg at bedtime/night.   Ambien 10 mg at bedtime/night.      Mixed hyperlipidemia 2022     Priority: Medium     Formatting of this note is different from the original.   Simvastatin 40mg.      Last Lipids:   Last Lipids:   Chol: 2022 271    109   HDL: 2022 71   Non-HDL: 2022 200   Chol/HDL Ratio: 2022 3.82   LDL DIRECT: . No results found in past 5 years    LDL CHOLESTEROL (mg/dL)    Date Value    2022 178 (H)       07/15/22    The ASCVD Risk score (Nealrobinson ENGLE Jr., et al., 2013) failed to calculate for the following reasons:     The patient has a prior MI or stroke diagnosis      Delirium due to methamphetamine intoxication (H) 03/10/2021     Priority: Medium    NSTEMI (non-ST elevated myocardial infarction) (H) 2020     Priority: Medium    Chest tightness 2020     Priority: Medium    Anemia 2016     Priority: Medium    Encephalopathy acute 2016     Priority: Medium    Hypothermia 2016     Priority: Medium    Neuropathic pain, arm 02/15/2016     Priority: Medium    H/O bipolar disorder 2012     Priority:  Medium    CARDIOVASCULAR SCREENING; LDL GOAL LESS THAN 160 05/09/2010     Priority: Medium    Methamphetamine abuse (H) 04/23/2010     Priority: Medium     Formatting of this note might be different from the original.   7/2022: She has a history of meth use, last use was 78 days ago as at 1/2022 visit but she was admitted for rhabdomyolysis 3/1/2022 after Meth use but has not used since then.  Last Assessment & Plan:    Formatting of this note might be different from the original.   Counseling when appropriate      Bipolar 2 disorder (H) 04/13/2010     Priority: Medium    PTSD (post-traumatic stress disorder) 04/13/2010     Priority: Medium    Other specified idiopathic peripheral neuropathy 01/08/2008     Priority: Medium    Lumbago 01/08/2008     Priority: Medium    Temporomandibular joint disorder 01/08/2008     Priority: Medium     Problem list name updated by automated process. Provider to review      Insomnia 11/02/2007     Priority: Medium     Problem list name updated by automated process. Provider to review          Past Medical History:    Past Medical History:   Diagnosis Date    Anxiety     Heroin abuse (H)     Methamphetamine abuse (H)     Schizophrenia (H)        Past Surgical History:    No past surgical history on file.    Family History:    Family History   Problem Relation Age of Onset    Glaucoma Mother     Diabetes No family hx of     C.A.D. No family hx of     Hypertension No family hx of     Cerebrovascular Disease No family hx of     Breast Cancer No family hx of     Cancer - colorectal No family hx of     Prostate Cancer No family hx of     Macular Degeneration No family hx of        Social History:  Marital Status:   [4]  Social History     Tobacco Use    Smoking status: Every Day     Current packs/day: 0.25     Types: Cigarettes    Smokeless tobacco: Current   Substance Use Topics    Alcohol use: No    Drug use: No        Medications:    oxyCODONE (ROXICODONE) 5 MG tablet  albuterol  "(PROAIR HFA/PROVENTIL HFA/VENTOLIN HFA) 108 (90 Base) MCG/ACT inhaler  AMBIEN 10 MG OR TABS  AMBIEN 10 MG OR TABS  AMBIEN 10 MG OR TABS  amoxicillin (AMOXIL) 875 MG tablet  CELEXA 20 MG OR TABS  FLONASE INHA 50 MCG/DOSE NA  folic acid (FOLVITE) 1 MG tablet  gabapentin (NEURONTIN) 600 MG tablet  guaiFENesin-codeine (ROBITUSSIN AC) 100-10 MG/5ML solution  hydrochlorothiazide (HYDRODIURIL) 25 MG tablet  HYDROcodone-acetaminophen (NORCO) 5-325 MG tablet  IBUPROFEN 800 MG OR TABS  lamoTRIgine (LAMICTAL) 200 MG tablet  methotrexate 2.5 MG tablet  predniSONE (DELTASONE) 20 MG tablet  simvastatin (ZOCOR) 20 MG tablet  VYVANSE 10 MG capsule          Review of Systems    Physical Exam   BP: (!) 152/109  Pulse: 73  Temp: 96.9  F (36.1  C)  Resp: 20  Height: 172.7 cm (5' 8\")  Weight: 90.7 kg (200 lb)  SpO2: 96 %      Physical Exam  Constitutional:       General: She is not in acute distress.     Appearance: She is well-developed.   HENT:      Head: Normocephalic and atraumatic.      Comments: Swelling and tenderness over the right side of the lower jaw with fluctuance and tenderness to palpation of the first molar.  Cardiovascular:      Rate and Rhythm: Normal rate.   Pulmonary:      Effort: No respiratory distress.      Breath sounds: No stridor.   Skin:     General: Skin is warm and dry.   Neurological:      Mental Status: She is alert.         ED [facility]    Dental Block    Date/Time: 11/25/2024 12:32 PM    Performed by: Alan Chaudhary MD  Authorized by: Alan Chaudhary MD    Risks, benefits and alternatives discussed.      INDICATIONS     Indications: dental pain      LOCATION     Block type:  Inferior alveolar    Laterality:  Right    PROCEDURE DETAILS     Syringe type:  Controlled syringe    Needle gauge:  27 G    Anesthetic injected:  Bupivacaine 0.5% w/o epi    Injection procedure:  Anatomic landmarks identified, introduced needle, incremental injection, " anatomic landmarks palpated and negative aspiration for blood    POST PROCEDURE DETAILS      Outcome:  Pain improved      PROCEDURE    Patient Tolerance:  Patient tolerated the procedure well with no immediate complications  [facility]    Dental Block    Date/Time: 11/25/2024 12:34 PM    Performed by: Alan Chaudhary MD  Authorized by: Alan Chaudhary MD    Risks, benefits and alternatives discussed.      INDICATIONS     Indications: dental pain      LOCATION     Block type:  Mental    Laterality:  Right    PROCEDURE DETAILS     Needle gauge:  27 G    Anesthetic injected:  Bupivacaine 0.5% w/o epi    Injection procedure:  Anatomic landmarks identified, introduced needle, incremental injection, anatomic landmarks palpated and negative aspiration for blood    POST PROCEDURE DETAILS      Outcome:  Pain improved      PROCEDURE    Patient Tolerance:  Patient tolerated the procedure well with no immediate complications  [facility]    PROCEDURE: -Incision/Drainage    Date/Time: 11/25/2024 2:27 PM    Performed by: Alan Chaudhary MD  Authorized by: Alan Chaudhary MD    Risks, benefits and alternatives discussed.      LOCATION:      Type:  Abscess    Location:  Mouth    PRE-PROCEDURE DETAILS:     Skin preparation:  Betadine    PROCEDURE TYPE:     Complexity:  Simple    PROCEDURE DETAILS:     Needle aspiration: no      Incision types:  Stab incision    Incision depth:  Dermal    Scalpel blade:  11    Wound management:  Probed and deloculated    Drainage:  Bloody    Drainage amount:  Moderate    Wound treatment:  Wound left open    Packing materials:  None    PROCEDURE    Patient Tolerance:  Patient tolerated the procedure well with no immediate complications                Critical Care time:  none              No results found for this or any previous visit (from the past 24 hours).    Medications   ondansetron (ZOFRAN ODT) ODT tab 4 mg (4  mg Oral $Given 11/25/24 1223)       Assessments & Plan (with Medical Decision Making)   Differential includes tooth eruption, pericoronitis, dental caries, pulpitis, periradicular periodontitis, facial cellulitis, post extraction alveolar osteitis, periodontal abscess, acute necrotizing ulcerative gingivitis. The patient's examination is most consistent with abscess. Nerve block and incision and drainage performed as above. The patient's symptoms were controlled and they were discharged with prescriptions for a short course of oxycodone and instructions to follow up in dental clinic. The patient was in agreement with this plan.    I have reviewed the nursing notes.    I have reviewed the findings, diagnosis, plan and need for follow up with the patient.               Discharge Medication List as of 11/25/2024  1:46 PM        START taking these medications    Details   oxyCODONE (ROXICODONE) 5 MG tablet Take 1 tablet (5 mg) by mouth every 6 hours as needed for severe pain., Disp-10 tablet, R-0, E-Prescribe             Final diagnoses:   Periodontal abscess       11/25/2024   [facility] EMERGENCY DEPT       Alan Chaudhary MD  11/25/24 0675

## 2025-08-18 ENCOUNTER — HOSPITAL ENCOUNTER (OUTPATIENT)
Dept: GENERAL RADIOLOGY | Facility: CLINIC | Age: 50
Discharge: HOME | End: 2025-08-18
Attending: INTERNAL MEDICINE | Admitting: INTERNAL MEDICINE
Payer: COMMERCIAL

## 2025-08-18 DIAGNOSIS — R13.10 DYSPHAGIA: ICD-10-CM

## 2025-08-18 PROCEDURE — 74221 X-RAY XM ESOPHAGUS 2CNTRST: CPT

## 2025-08-18 PROCEDURE — 250N000013 HC RX MED GY IP 250 OP 250 PS 637: Performed by: INTERNAL MEDICINE

## 2025-08-18 RX ADMIN — ANTACID/ANTIFLATULENT 4 G: 380; 550; 10; 10 GRANULE, EFFERVESCENT ORAL at 15:33
